# Patient Record
Sex: FEMALE | Race: WHITE | NOT HISPANIC OR LATINO | Employment: UNEMPLOYED | URBAN - METROPOLITAN AREA
[De-identification: names, ages, dates, MRNs, and addresses within clinical notes are randomized per-mention and may not be internally consistent; named-entity substitution may affect disease eponyms.]

---

## 2017-09-29 ENCOUNTER — OFFICE VISIT (OUTPATIENT)
Dept: INTERNAL MEDICINE | Facility: CLINIC | Age: 32
End: 2017-09-29

## 2017-09-29 ENCOUNTER — TELEPHONE (OUTPATIENT)
Dept: INTERNAL MEDICINE | Facility: CLINIC | Age: 32
End: 2017-09-29

## 2017-09-29 VITALS
HEIGHT: 61 IN | HEART RATE: 85 BPM | WEIGHT: 109.4 LBS | BODY MASS INDEX: 20.65 KG/M2 | TEMPERATURE: 97.5 F | DIASTOLIC BLOOD PRESSURE: 76 MMHG | SYSTOLIC BLOOD PRESSURE: 118 MMHG | OXYGEN SATURATION: 97 %

## 2017-09-29 DIAGNOSIS — G47.00 INSOMNIA, UNSPECIFIED TYPE: ICD-10-CM

## 2017-09-29 DIAGNOSIS — S00.411A: ICD-10-CM

## 2017-09-29 DIAGNOSIS — G43.109 MIGRAINE WITH AURA AND WITHOUT STATUS MIGRAINOSUS, NOT INTRACTABLE: Primary | ICD-10-CM

## 2017-09-29 DIAGNOSIS — M53.3 CHRONIC SI JOINT PAIN: ICD-10-CM

## 2017-09-29 DIAGNOSIS — R39.198 DIFFICULTY URINATING: ICD-10-CM

## 2017-09-29 DIAGNOSIS — G89.29 CHRONIC SI JOINT PAIN: ICD-10-CM

## 2017-09-29 DIAGNOSIS — L08.9 SKIN INFECTION: ICD-10-CM

## 2017-09-29 DIAGNOSIS — Z79.899 LONG-TERM USE OF HIGH-RISK MEDICATION: ICD-10-CM

## 2017-09-29 LAB
AMPHET+METHAMPHET UR QL: NEGATIVE
AMPHETAMINES UR QL: NEGATIVE
B-HCG UR QL: NEGATIVE
BARBITURATES UR QL SCN: NEGATIVE
BENZODIAZ UR QL SCN: NEGATIVE
BILIRUB BLD-MCNC: NEGATIVE MG/DL
BUPRENORPHINE SERPL-MCNC: NEGATIVE NG/ML
CANNABINOIDS SERPL QL: NEGATIVE
CLARITY, POC: CLEAR
COCAINE UR QL: NEGATIVE
COLOR UR: NORMAL
GLUCOSE UR STRIP-MCNC: NEGATIVE MG/DL
INTERNAL NEGATIVE CONTROL: NEGATIVE
INTERNAL POSITIVE CONTROL: POSITIVE
KETONES UR QL: NEGATIVE
LEUKOCYTE EST, POC: NEGATIVE
Lab: NORMAL
METHADONE UR QL SCN: NEGATIVE
MRSA DNA SPEC QL NAA+PROBE: NEGATIVE
NITRITE UR-MCNC: NEGATIVE MG/ML
OPIATES UR QL: NEGATIVE
OXYCODONE UR QL SCN: NEGATIVE
PCP UR QL SCN: NEGATIVE
PH UR: 6 [PH] (ref 5–8)
PROPOXYPH UR QL: NEGATIVE
PROT UR STRIP-MCNC: NEGATIVE MG/DL
RBC # UR STRIP: NEGATIVE /UL
SP GR UR: 1.01 (ref 1–1.03)
TRICYCLICS UR QL SCN: NEGATIVE
UROBILINOGEN UR QL: NORMAL

## 2017-09-29 PROCEDURE — 99203 OFFICE O/P NEW LOW 30 MIN: CPT | Performed by: FAMILY MEDICINE

## 2017-09-29 PROCEDURE — 81025 URINE PREGNANCY TEST: CPT | Performed by: FAMILY MEDICINE

## 2017-09-29 PROCEDURE — 80306 DRUG TEST PRSMV INSTRMNT: CPT | Performed by: FAMILY MEDICINE

## 2017-09-29 PROCEDURE — 87641 MR-STAPH DNA AMP PROBE: CPT | Performed by: FAMILY MEDICINE

## 2017-09-29 RX ORDER — BUTALBITAL, ACETAMINOPHEN AND CAFFEINE 300; 40; 50 MG/1; MG/1; MG/1
1 CAPSULE ORAL EVERY 4 HOURS PRN
Qty: 84 CAPSULE | Refills: 1 | Status: SHIPPED | OUTPATIENT
Start: 2017-09-29 | End: 2017-09-29 | Stop reason: SDUPTHER

## 2017-09-29 RX ORDER — LANOLIN ALCOHOL/MO/W.PET/CERES
1 CREAM (GRAM) TOPICAL DAILY
Qty: 30 TABLET | Refills: 3 | OUTPATIENT
Start: 2017-09-29 | End: 2020-01-19

## 2017-09-29 RX ORDER — BUTALBITAL, ACETAMINOPHEN AND CAFFEINE 300; 40; 50 MG/1; MG/1; MG/1
1 CAPSULE ORAL EVERY 4 HOURS PRN
Qty: 30 CAPSULE | Refills: 1 | Status: SHIPPED | OUTPATIENT
Start: 2017-09-29 | End: 2017-11-24

## 2017-09-29 RX ORDER — ZOLPIDEM TARTRATE 10 MG/1
10 TABLET ORAL NIGHTLY PRN
Qty: 30 TABLET | Refills: 1 | Status: SHIPPED | OUTPATIENT
Start: 2017-09-29 | End: 2017-11-28 | Stop reason: SDUPTHER

## 2017-09-29 NOTE — TELEPHONE ENCOUNTER
----- Message from Anai PURDY MD sent at 9/29/2017 12:22 PM EDT -----  Regarding: colonoscopy report  Check for colonoscopy report [facility] about 2005

## 2017-09-29 NOTE — TELEPHONE ENCOUNTER
Spoke to Katie at  medical records.  Their records only go back to 2010.  They do not have a colonoscopy
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
## 2017-11-22 ENCOUNTER — TELEPHONE (OUTPATIENT)
Dept: INTERNAL MEDICINE | Facility: CLINIC | Age: 32
End: 2017-11-22

## 2017-11-24 ENCOUNTER — APPOINTMENT (OUTPATIENT)
Dept: CT IMAGING | Facility: HOSPITAL | Age: 32
End: 2017-11-24

## 2017-11-24 ENCOUNTER — HOSPITAL ENCOUNTER (EMERGENCY)
Facility: HOSPITAL | Age: 32
Discharge: HOME | End: 2017-11-24
Attending: EMERGENCY MEDICINE | Admitting: EMERGENCY MEDICINE

## 2017-11-24 VITALS
DIASTOLIC BLOOD PRESSURE: 80 MMHG | HEART RATE: 94 BPM | WEIGHT: 110 LBS | SYSTOLIC BLOOD PRESSURE: 121 MMHG | BODY MASS INDEX: 21.6 KG/M2 | TEMPERATURE: 98.3 F | RESPIRATION RATE: 16 BRPM | HEIGHT: 60 IN | OXYGEN SATURATION: 100 %

## 2017-11-24 DIAGNOSIS — G51.0 BELL'S PALSY: Primary | ICD-10-CM

## 2017-11-24 DIAGNOSIS — Z86.69 HISTORY OF MIGRAINE: ICD-10-CM

## 2017-11-24 DIAGNOSIS — G43.109 MIGRAINE WITH AURA AND WITHOUT STATUS MIGRAINOSUS, NOT INTRACTABLE: ICD-10-CM

## 2017-11-24 PROCEDURE — 99283 EMERGENCY DEPT VISIT LOW MDM: CPT

## 2017-11-24 PROCEDURE — 70450 CT HEAD/BRAIN W/O DYE: CPT

## 2017-11-24 RX ORDER — TRAMADOL HYDROCHLORIDE 50 MG/1
50 TABLET ORAL ONCE
Status: COMPLETED | OUTPATIENT
Start: 2017-11-24 | End: 2017-11-24

## 2017-11-24 RX ORDER — BUTALBITAL, ACETAMINOPHEN AND CAFFEINE 300; 40; 50 MG/1; MG/1; MG/1
1 CAPSULE ORAL EVERY 4 HOURS PRN
Qty: 15 CAPSULE | Refills: 0 | Status: SHIPPED | OUTPATIENT
Start: 2017-11-24 | End: 2017-11-27

## 2017-11-24 RX ORDER — METHYLPREDNISOLONE 4 MG/1
TABLET ORAL
Qty: 21 TABLET | Refills: 0 | Status: SHIPPED | OUTPATIENT
Start: 2017-11-24 | End: 2017-12-22

## 2017-11-24 RX ORDER — MINERAL OIL, PETROLATUM 425; 568 MG/G; MG/G
OINTMENT OPHTHALMIC
Qty: 7 G | Refills: 0 | OUTPATIENT
Start: 2017-11-24 | End: 2020-01-19

## 2017-11-24 RX ORDER — KETOROLAC TROMETHAMINE 15 MG/ML
15 INJECTION, SOLUTION INTRAMUSCULAR; INTRAVENOUS ONCE
Status: DISCONTINUED | OUTPATIENT
Start: 2017-11-24 | End: 2017-11-24

## 2017-11-24 RX ORDER — ACYCLOVIR 400 MG/1
400 TABLET ORAL
Qty: 35 TABLET | Refills: 0 | Status: SHIPPED | OUTPATIENT
Start: 2017-11-24 | End: 2017-12-22

## 2017-11-24 RX ORDER — TRAMADOL HYDROCHLORIDE 50 MG/1
50 TABLET ORAL EVERY 6 HOURS PRN
Qty: 15 TABLET | Refills: 0 | Status: SHIPPED | OUTPATIENT
Start: 2017-11-24 | End: 2017-12-22

## 2017-11-24 RX ADMIN — TRAMADOL HYDROCHLORIDE 50 MG: 50 TABLET, COATED ORAL at 18:40

## 2017-11-24 NOTE — DISCHARGE INSTRUCTIONS
Use lubricating drops/ointment every hour as needed to prevent dryness of the eye.  Follow-up with Dr. Green Monday morning for recheck.  Return to the emergency department immediately if any change or worsening of your symptoms.

## 2017-11-24 NOTE — ED PROVIDER NOTES
Subjective   Patient is a 32 y.o. female presenting with neurologic complaint.   History provided by:  Patient  Neurologic Problem   The patient's primary symptoms include focal weakness. This is a new problem. The current episode started today. The neurological problem developed suddenly. Last Known Well [facility]: Yesterday evening.  The problem is unchanged. There was left-sided and facial focality noted. Associated symptoms include headaches. Pertinent negatives include no abdominal pain, auditory change, aura, back pain, confusion, dizziness, fatigue, fever, nausea, neck pain, vertigo or vomiting. Past treatments include nothing. There is no history of a bleeding disorder, a CVA, dementia, head trauma, liver disease, mood changes or seizures.     32-year-old female presents to emergency department with right sided facial droop noticed this morning.  Patient has had a headache for the past 2 days, predominantly left-sided, associated with some nausea but no vomiting.  She denies upper extremity or lower show any weakness paresis paresthesias or radicular symptoms.  No photophobia.  She denies recent infection trauma.  No recent changes to her medication.  She states the facial droop is associated with inability to close her left eye, and she is having some slight drooling from the left side of her mouth.  She says the left side of her tongue is numb.  She does have some left-sided facial paresthesias as well.    Review of Systems   Constitutional: Negative for fatigue and fever.   Gastrointestinal: Negative for abdominal pain, nausea and vomiting.   Musculoskeletal: Negative for back pain and neck pain.   Neurological: Positive for focal weakness and headaches. Negative for dizziness and vertigo.        Per history of present illness   Psychiatric/Behavioral: Negative for confusion.   All other systems reviewed and are negative.      Past Medical History:   Diagnosis Date   • Fracture of lumbar vertebra     age 14    • Fractures    • History of severe pre-eclampsia    • Hypertension    • Kidney infection    • Kidney stone    • Migraine    • Preeclampsia, severe    • Seizures 2007    one seizure post partum period preeclampsia   • Urinary tract infection        Allergies   Allergen Reactions   • Benadryl [Diphenhydramine] Shortness Of Breath     Heart racing, muscle cramping   • Vancomycin Shortness Of Breath     Red man syndrome   • Compazine [Prochlorperazine] Other (See Comments)     Restless and agitation   • Reglan [Metoclopramide] Other (See Comments)     agitation   • Toradol [Ketorolac Tromethamine] Other (See Comments)     Heart racing and increased BP   • Triptans Other (See Comments)     restless       Past Surgical History:   Procedure Laterality Date   • BREAST AUGMENTATION Bilateral 2009   •  SECTION      x 2-   ,    • COLONOSCOPY  2005    bleeding, attempt at EGD unsuccessful-polyps found [facility]   • KIDNEY STONE SURGERY     • WISDOM TOOTH EXTRACTION         Family History   Problem Relation Age of Onset   • Arthritis Mother    • Hypertension Mother    • Depression Mother    • Cancer Father      throat   • Diabetes Father    • Hypertension Father    • Heart disease Maternal Grandmother    • Hypertension Maternal Grandfather    • No Known Problems Brother    • Mitochondrial disorder Son    • Neutropenia Son    • Anemia Son      sideroblastic   • Hypertension Maternal Aunt    • Thyroid disease Maternal Aunt    • Migraines Maternal Aunt    • Depression Maternal Aunt    • Arthritis Paternal Grandmother    • Cancer Paternal Grandmother      skin   • Heart disease Paternal Grandmother      stents   • Hyperlipidemia Paternal Grandfather    • Hypertension Paternal Grandfather    • Diabetes Paternal Grandfather    • Neutropenia Daughter    • Anemia Daughter      sideroblastic   • No Known Problems Brother    • No Known Problems Brother        Social History     Social History   • Marital status: Single      Spouse name: N/A   • Number of children: N/A   • Years of education: N/A     Social History Main Topics   • Smoking status: Never Smoker   • Smokeless tobacco: Never Used   • Alcohol use No   • Drug use: No   • Sexual activity: Yes     Partners: Male     Birth control/ protection: Condom      Comment:      Other Topics Concern   • None     Social History Narrative    Working part time           Objective   Physical Exam   Constitutional: She is oriented to person, place, and time. She appears well-developed and well-nourished. No distress.   HENT:   Head: Normocephalic and atraumatic.   Right Ear: External ear normal.   Left Ear: External ear normal.   Nose: Nose normal.   Mouth/Throat: Oropharynx is clear and moist. No oropharyngeal exudate.   Left-sided facial droop including the forehead.  PERRLA EOMI visual acuity grossly intact visual fields are grossly full.  There is no pre-or postauricular tenderness, no mastoid tenderness.  No scalp or facial lesions noted.   Eyes: Conjunctivae and EOM are normal. Pupils are equal, round, and reactive to light. Right eye exhibits no discharge. Left eye exhibits no discharge. No scleral icterus.   Neck: Normal range of motion. Neck supple. No JVD present. No tracheal deviation present. No thyromegaly present.   Cardiovascular: Normal rate, regular rhythm, normal heart sounds and intact distal pulses.  Exam reveals no gallop and no friction rub.    No murmur heard.  Pulmonary/Chest: Effort normal. No stridor. No respiratory distress. She has no wheezes. She has no rales. She exhibits no tenderness.   Abdominal: Soft. She exhibits no distension and no mass. There is no tenderness. There is no guarding.   Musculoskeletal: Normal range of motion. She exhibits no edema, tenderness or deformity.   Neurological: She is alert and oriented to person, place, and time. She displays normal reflexes. A cranial nerve deficit (Left-sided facial nerve palsy.) is present. She  exhibits normal muscle tone (Left facial weakness per history of present illness.  No upper or lower extremity weakness paresis or paresthesias noted.). Coordination normal.   Cerebellar functions are intact, no pronator drift.  DTRs are 2+ over 4+ bilaterally in the upper and lower extremities.   Skin: Skin is warm and dry. No rash noted. She is not diaphoretic. No erythema. No pallor.   Psychiatric: She has a normal mood and affect. Her behavior is normal. Judgment and thought content normal.   Nursing note and vitals reviewed.      Procedures         ED Course  ED Course   Comment By Time   Neurologic findings are limited to the left facial area, consistent with Bell's palsy.  CT of the head without contrast is negative for acute findings.  Reviewed with Dr. Sifuentes, will treat with prednisone and Zovirax, and tramadol for pain.  She will follow-up with her primary care provider on Monday, and return if any change or worsening in the interim.  She voices understanding and is agreeable to plan. Rusty Currie PA-C 11/24 2212                  [facility]    Final diagnoses:   Bell's palsy   History of migraine            Rusty Currie PA-C  11/24/17 2212

## 2017-11-27 RX ORDER — BUTALBITAL, ACETAMINOPHEN AND CAFFEINE 50; 325; 40 MG/1; MG/1; MG/1
1 TABLET ORAL EVERY 4 HOURS PRN
Qty: 15 TABLET | Refills: 0 | Status: SHIPPED | OUTPATIENT
Start: 2017-11-27 | End: 2017-12-22 | Stop reason: SDUPTHER

## 2017-11-27 NOTE — TELEPHONE ENCOUNTER
[facility] called back today. The formulary accepts 50/325/40 instead of 50/300/40. They initially had approved the written prescription, but called back immediately and retracted, asking that we write for the formulary please.

## 2017-11-28 ENCOUNTER — TELEPHONE (OUTPATIENT)
Dept: INTERNAL MEDICINE | Facility: CLINIC | Age: 32
End: 2017-11-28

## 2017-11-28 DIAGNOSIS — G47.00 INSOMNIA, UNSPECIFIED TYPE: ICD-10-CM

## 2017-11-28 RX ORDER — ZOLPIDEM TARTRATE 10 MG/1
10 TABLET ORAL NIGHTLY PRN
Qty: 30 TABLET | Refills: 0 | Status: SHIPPED | OUTPATIENT
Start: 2017-11-28 | End: 2018-02-07 | Stop reason: SDUPTHER

## 2017-11-28 NOTE — TELEPHONE ENCOUNTER
Refill on printer,  If [place] palsy getting worse since ER visit, should be seen today and not wait until Friday

## 2017-11-28 NOTE — TELEPHONE ENCOUNTER
Called patient because pharmacy on file has no record of her prescriptions. She is coming Friday for a flare up of bells palsy. She would like a refill of her ambien called in to the [facility] on [address].

## 2017-12-11 ENCOUNTER — TELEPHONE (OUTPATIENT)
Dept: INTERNAL MEDICINE | Facility: CLINIC | Age: 32
End: 2017-12-11

## 2017-12-11 NOTE — TELEPHONE ENCOUNTER
PLEASE CALL PT SHE NEEDS A MED CALLED IN BUT UNCLEAR STATES SHE CALLED A WEEK AGO NO MESSAGE WAS SEEN

## 2017-12-22 ENCOUNTER — OFFICE VISIT (OUTPATIENT)
Dept: INTERNAL MEDICINE | Facility: CLINIC | Age: 32
End: 2017-12-22

## 2017-12-22 VITALS
DIASTOLIC BLOOD PRESSURE: 82 MMHG | TEMPERATURE: 97.9 F | SYSTOLIC BLOOD PRESSURE: 128 MMHG | BODY MASS INDEX: 19.61 KG/M2 | OXYGEN SATURATION: 98 % | WEIGHT: 100.4 LBS | HEART RATE: 101 BPM

## 2017-12-22 DIAGNOSIS — G43.109 MIGRAINE WITH AURA AND WITHOUT STATUS MIGRAINOSUS, NOT INTRACTABLE: Primary | ICD-10-CM

## 2017-12-22 DIAGNOSIS — F43.29 STRESS AND ADJUSTMENT REACTION: ICD-10-CM

## 2017-12-22 DIAGNOSIS — R63.4 WEIGHT LOSS: ICD-10-CM

## 2017-12-22 DIAGNOSIS — Z23 NEED FOR INFLUENZA VACCINATION: ICD-10-CM

## 2017-12-22 DIAGNOSIS — Z20.5 EXPOSURE TO HEPATITIS: ICD-10-CM

## 2017-12-22 DIAGNOSIS — M79.89 SWELLING OF BOTH HANDS: ICD-10-CM

## 2017-12-22 DIAGNOSIS — R68.81 EARLY SATIETY: ICD-10-CM

## 2017-12-22 PROCEDURE — 99214 OFFICE O/P EST MOD 30 MIN: CPT | Performed by: FAMILY MEDICINE

## 2017-12-22 RX ORDER — BUTALBITAL, ACETAMINOPHEN AND CAFFEINE 50; 325; 40 MG/1; MG/1; MG/1
1 TABLET ORAL EVERY 4 HOURS PRN
Qty: 60 TABLET | Refills: 0 | Status: SHIPPED | OUTPATIENT
Start: 2017-12-22 | End: 2018-02-07 | Stop reason: SDUPTHER

## 2017-12-22 NOTE — PROGRESS NOTES
Subjective   Marcia Goel is a 32 y.o. female.     Chief Complaint   Patient presents with   • Weight Loss   • bell's Palsy   • migraine medication     insurance requested quantity of 60 or 90       Visit Vitals   • /82   • Pulse 101   • Temp 97.9 °F (36.6 °C)   • Wt 45.5 kg (100 lb 6.4 oz)   • SpO2 98%   • BMI 19.61 kg/m2       Weight Loss   This is a new problem. The current episode started more than 1 month ago. The problem occurs constantly. The problem has been unchanged. Associated symptoms include anorexia, arthralgias, fatigue, headaches, joint swelling, nausea and neck pain. Pertinent negatives include no abdominal pain, change in bowel habit, chest pain, chills, congestion, coughing, diaphoresis, fever, myalgias, numbness, rash, sore throat, swollen glands, urinary symptoms, vertigo, visual change, vomiting or weakness. Exacerbated by: decreased appetite. She has tried nothing for the symptoms. The treatment provided no relief.   Headache    This is a recurrent problem. The current episode started in the past 7 days. Episode frequency: 2 x per week and lasts for a few days. The problem has been unchanged. The pain is located in the bilateral and occipital region. The pain does not radiate. The pain quality is similar to prior headaches. The quality of the pain is described as throbbing. The pain is at a severity of 8/10. The pain is moderate. Associated symptoms include anorexia, insomnia, nausea, neck pain, tingling and weight loss. Pertinent negatives include no abdominal pain, abnormal behavior, back pain, blurred vision, coughing, dizziness, drainage, ear pain, eye pain, eye redness, eye watering, facial sweating, fever, hearing loss, loss of balance, muscle aches, numbness, phonophobia, photophobia, rhinorrhea, scalp tenderness, seizures, sinus pressure, sore throat, swollen glands, tinnitus, visual change, vomiting or weakness. Treatments tried: fioricet. The treatment provided moderate  relief.        Pt has been getting migraines 1-2 x per week.   Pt has a lot of stress and the tension headaches will turn into migraine.  Pt is taking magnesium.  Pt's roommate had hepatitis B. They did share razors.     Pt had left Bell's palsy this was the 2nd time she had Libby.    Pt had 2 disabled children that she cannot see and is in court for visitation.   Pt is moving out of hotel this weekend.  Parents and step parent  in the past 4 years.    Pt has decreased appetite and is not eating as much. Pt has lost 10# in the past month.  Hands and feet are swollen every day when she gets up.   Pt having vaginal discharge.   The following portions of the patient's history were reviewed and updated as appropriate: allergies, current medications, past family history, past medical history, past social history, past surgical history and problem list.    Past Medical History:   Diagnosis Date   • Fracture of lumbar vertebra     age 14   • Fractures    • History of severe pre-eclampsia    • Hypertension    • Kidney infection    • Kidney stone    • Migraine    • Preeclampsia, severe    • Seizures     one seizure post partum period preeclampsia   • Urinary tract infection      Past Surgical History:   Procedure Laterality Date   • BREAST AUGMENTATION Bilateral    •  SECTION      x 2-   ,    • COLONOSCOPY  2005    bleeding, attempt at EGD unsuccessful-polyps found [facility]   • KIDNEY STONE SURGERY     • WISDOM TOOTH EXTRACTION       Allergies   Allergen Reactions   • Benadryl [Diphenhydramine] Shortness Of Breath     Heart racing, muscle cramping   • Vancomycin Shortness Of Breath     Red man syndrome   • Compazine [Prochlorperazine] Other (See Comments)     Restless and agitation   • Reglan [Metoclopramide] Other (See Comments)     agitation   • Toradol [Ketorolac Tromethamine] Other (See Comments)     Heart racing and increased BP   • Triptans Other (See Comments)     restless       Family  History   Problem Relation Age of Onset   • Arthritis Mother    • Hypertension Mother    • Depression Mother    • Cancer Father      throat   • Diabetes Father    • Hypertension Father    • Heart disease Maternal Grandmother    • Hypertension Maternal Grandfather    • No Known Problems Brother    • Mitochondrial disorder Son    • Neutropenia Son    • Anemia Son      sideroblastic   • Hypertension Maternal Aunt    • Thyroid disease Maternal Aunt    • Migraines Maternal Aunt    • Depression Maternal Aunt    • Arthritis Paternal Grandmother    • Cancer Paternal Grandmother      skin   • Heart disease Paternal Grandmother      stents   • Hyperlipidemia Paternal Grandfather    • Hypertension Paternal Grandfather    • Diabetes Paternal Grandfather    • Neutropenia Daughter    • Anemia Daughter      sideroblastic   • No Known Problems Brother    • No Known Problems Brother        Social History     Social History   • Marital status: Single     Spouse name: N/A   • Number of children: N/A   • Years of education: N/A     Occupational History   • Not on file.     Social History Main Topics   • Smoking status: Never Smoker   • Smokeless tobacco: Never Used   • Alcohol use No   • Drug use: No   • Sexual activity: Yes     Partners: Male     Birth control/ protection: Condom      Comment:      Other Topics Concern   • Not on file     Social History Narrative    Working part time       Review of Systems   Constitutional: Positive for fatigue and weight loss. Negative for chills, diaphoresis and fever.   HENT: Negative for congestion, ear pain, hearing loss, nosebleeds, postnasal drip, rhinorrhea, sinus pressure, sneezing, sore throat and tinnitus.    Eyes: Negative.  Negative for blurred vision, photophobia, pain, redness and itching.   Respiratory: Negative.  Negative for cough, shortness of breath and wheezing.    Cardiovascular: Negative.  Negative for chest pain and palpitations.   Gastrointestinal: Positive for  anorexia and nausea. Negative for abdominal pain, change in bowel habit, constipation, diarrhea and vomiting.   Endocrine: Negative.  Negative for cold intolerance and heat intolerance.   Genitourinary: Negative.  Negative for dysuria, frequency, hematuria and urgency.   Musculoskeletal: Positive for arthralgias, joint swelling and neck pain. Negative for back pain and myalgias.   Skin: Negative.  Negative for color change and rash.   Allergic/Immunologic: Negative.  Negative for environmental allergies.   Neurological: Positive for tingling and headaches. Negative for dizziness, vertigo, seizures, syncope, weakness, light-headedness, numbness and loss of balance.   Hematological: Negative.  Negative for adenopathy. Does not bruise/bleed easily.   Psychiatric/Behavioral: Positive for dysphoric mood. The patient has insomnia. The patient is not nervous/anxious.         Situational depression       Objective   Physical Exam   Constitutional: She is oriented to person, place, and time. She appears well-developed.   HENT:   Head: Normocephalic.   Right Ear: External ear normal.   Left Ear: External ear normal.   Nose: Nose normal.   Eyes: Conjunctivae and EOM are normal. Pupils are equal, round, and reactive to light.   Neck: Normal range of motion. Neck supple. Muscular tenderness present. No spinous process tenderness present. No thyroid mass and no thyromegaly present.   Cardiovascular: Normal rate and regular rhythm.    No murmur heard.  Pulmonary/Chest: Effort normal and breath sounds normal.   Abdominal: Soft. Bowel sounds are normal.   Musculoskeletal: Normal range of motion.   Neurological: She is alert and oriented to person, place, and time.   Skin: Skin is warm and dry.   Psychiatric: She has a normal mood and affect. Her behavior is normal. Judgment and thought content normal. Her speech is rapid and/or pressured. Cognition and memory are normal.   Nursing note and vitals reviewed.      Assessment/Plan    Marcia was seen today for weight loss, bell's palsy and migraine medication.    Diagnoses and all orders for this visit:    Migraine with aura and without status migrainosus, not intractable  -     butalbital-acetaminophen-caffeine (ESGIC) -40 MG per tablet; Take 1 tablet by mouth Every 4 (Four) Hours As Needed for Headache.    Stress and adjustment reaction  -     Ambulatory Referral to Behavioral Health    Weight loss  -     Comprehensive Metabolic Panel  -     CBC & Differential  -     TSH  -     C-reactive Protein  -     Sedimentation Rate  -     Nutritional Supplements (ENSURE COMPLETE) liquid; Take 1 bottle by mouth Daily.    Swelling of both hands  -     C-reactive Protein  -     Sedimentation Rate  -     Nuclear Antigen Antibody, IFA  -     Rheumatoid Factor    Early satiety  -     Lipase  -     Amylase  -     H. Pylori Breath Test - Breath, Lung    Exposure to hepatitis  -     Comprehensive Metabolic Panel  -     Hepatitis Panel, Acute    Need for influenza vaccination  -     Cancel: Flu Vaccine Quad PF 3YR+ (FLUARIX [id number])                   Current Outpatient Prescriptions:   •  artificial tears (REFRESH LACRI-LUBE) ointment ophthalmic ointment, Administer  to both eyes Every 1 (One) Hour As Needed (7)., Disp: 7 g, Rfl: 0  •  butalbital-acetaminophen-caffeine (ESGIC) -40 MG per tablet, Take 1 tablet by mouth Every 4 (Four) Hours As Needed for Headache., Disp: 60 tablet, Rfl: 0  •  Magnesium Oxide 400 (240 Mg) MG tablet, Take 1 tablet by mouth Daily., Disp: 30 tablet, Rfl: 3  •  zolpidem (AMBIEN) 10 MG tablet, Take 1 tablet by mouth At Night As Needed for Sleep., Disp: 30 tablet, Rfl: 0  •  Nutritional Supplements (ENSURE COMPLETE) liquid, Take 1 bottle by mouth Daily., Disp: 30 bottle, Rfl: 1    Return in about 4 weeks (around 1/19/2018), or if symptoms worsen or fail to improve, for Recheck.

## 2018-02-05 ENCOUNTER — TELEPHONE (OUTPATIENT)
Dept: INTERNAL MEDICINE | Facility: CLINIC | Age: 33
End: 2018-02-05

## 2018-02-05 DIAGNOSIS — G47.00 INSOMNIA, UNSPECIFIED TYPE: ICD-10-CM

## 2018-02-05 DIAGNOSIS — G43.109 MIGRAINE WITH AURA AND WITHOUT STATUS MIGRAINOSUS, NOT INTRACTABLE: ICD-10-CM

## 2018-02-07 RX ORDER — BUTALBITAL, ACETAMINOPHEN AND CAFFEINE 50; 325; 40 MG/1; MG/1; MG/1
1 TABLET ORAL EVERY 4 HOURS PRN
Qty: 30 TABLET | Refills: 0 | Status: SHIPPED | OUTPATIENT
Start: 2018-02-07 | End: 2018-03-13 | Stop reason: SDUPTHER

## 2018-02-07 RX ORDER — ZOLPIDEM TARTRATE 10 MG/1
10 TABLET ORAL NIGHTLY PRN
Qty: 30 TABLET | Refills: 0 | Status: SHIPPED | OUTPATIENT
Start: 2018-02-07 | End: 2018-03-13 | Stop reason: SDUPTHER

## 2018-02-07 NOTE — TELEPHONE ENCOUNTER
Notified patient that ambien was written. She stated that it was also a request for her fiorcet as her migraines get very bad. Informed patient that it may be a little longer as I have to get that refill approved as well. She asked that the medications be called in to [address]

## 2018-02-07 NOTE — TELEPHONE ENCOUNTER
Called Mrs Goel to inquire about her repeated no shows. She denies knowing about the visits at first, but then recalled rescheduling them? Mrs Goel believed that she had left messages on our answering machine. I double checked up front as I do not believe we have an answering machine at this office. She also has had a hard time getting rides, but stated that she does have an [employer] gift card now.

## 2018-03-07 ENCOUNTER — TELEPHONE (OUTPATIENT)
Dept: INTERNAL MEDICINE | Facility: CLINIC | Age: 33
End: 2018-03-07

## 2018-03-12 ENCOUNTER — TELEPHONE (OUTPATIENT)
Dept: INTERNAL MEDICINE | Facility: CLINIC | Age: 33
End: 2018-03-12

## 2018-03-12 NOTE — TELEPHONE ENCOUNTER
Spoke with Mrs Goel. She has cracked/chapped fingers. She does not recall any trauma or chemical injury. She is using neosporin and vasoline-type ointment. Advised her to keep appointment tomorrow and not use lotions which may irritate the open skin where she has some split skin.

## 2018-03-13 ENCOUNTER — TELEPHONE (OUTPATIENT)
Dept: INTERNAL MEDICINE | Facility: CLINIC | Age: 33
End: 2018-03-13

## 2018-03-13 ENCOUNTER — OFFICE VISIT (OUTPATIENT)
Dept: INTERNAL MEDICINE | Facility: CLINIC | Age: 33
End: 2018-03-13

## 2018-03-13 VITALS
OXYGEN SATURATION: 99 % | HEART RATE: 108 BPM | TEMPERATURE: 98.7 F | WEIGHT: 109 LBS | SYSTOLIC BLOOD PRESSURE: 132 MMHG | BODY MASS INDEX: 21.29 KG/M2 | DIASTOLIC BLOOD PRESSURE: 92 MMHG

## 2018-03-13 DIAGNOSIS — Z79.899 ENCOUNTER FOR LONG-TERM CURRENT USE OF MEDICATION: ICD-10-CM

## 2018-03-13 DIAGNOSIS — R21 RASH OF HANDS: Primary | ICD-10-CM

## 2018-03-13 DIAGNOSIS — G47.00 INSOMNIA, UNSPECIFIED TYPE: ICD-10-CM

## 2018-03-13 DIAGNOSIS — G43.109 MIGRAINE WITH AURA AND WITHOUT STATUS MIGRAINOSUS, NOT INTRACTABLE: ICD-10-CM

## 2018-03-13 DIAGNOSIS — R82.90 ABNORMAL URINE ODOR: ICD-10-CM

## 2018-03-13 DIAGNOSIS — F43.9 STRESS: ICD-10-CM

## 2018-03-13 LAB
AMPHET+METHAMPHET UR QL: POSITIVE
AMPHETAMINES UR QL: NEGATIVE
BARBITURATES UR QL SCN: NEGATIVE
BENZODIAZ UR QL SCN: NEGATIVE
BILIRUB BLD-MCNC: NEGATIVE MG/DL
BUPRENORPHINE SERPL-MCNC: NEGATIVE NG/ML
CANNABINOIDS SERPL QL: NEGATIVE
CLARITY, POC: CLEAR
COCAINE UR QL: NEGATIVE
COLOR UR: YELLOW
GLUCOSE UR STRIP-MCNC: NEGATIVE MG/DL
KETONES UR QL: NEGATIVE
LEUKOCYTE EST, POC: NEGATIVE
METHADONE UR QL SCN: NEGATIVE
NITRITE UR-MCNC: NEGATIVE MG/ML
OPIATES UR QL: NEGATIVE
OXYCODONE UR QL SCN: NEGATIVE
PCP UR QL SCN: NEGATIVE
PH UR: 6 [PH] (ref 5–8)
PROPOXYPH UR QL: NEGATIVE
PROT UR STRIP-MCNC: NEGATIVE MG/DL
RBC # UR STRIP: ABNORMAL /UL
SP GR UR: 1.03 (ref 1–1.03)
TRICYCLICS UR QL SCN: NEGATIVE
UROBILINOGEN UR QL: NORMAL

## 2018-03-13 PROCEDURE — 80306 DRUG TEST PRSMV INSTRMNT: CPT | Performed by: FAMILY MEDICINE

## 2018-03-13 PROCEDURE — 87186 SC STD MICRODIL/AGAR DIL: CPT | Performed by: FAMILY MEDICINE

## 2018-03-13 PROCEDURE — 99214 OFFICE O/P EST MOD 30 MIN: CPT | Performed by: FAMILY MEDICINE

## 2018-03-13 PROCEDURE — 87077 CULTURE AEROBIC IDENTIFY: CPT | Performed by: FAMILY MEDICINE

## 2018-03-13 PROCEDURE — 87086 URINE CULTURE/COLONY COUNT: CPT | Performed by: FAMILY MEDICINE

## 2018-03-13 RX ORDER — BUTALBITAL, ACETAMINOPHEN AND CAFFEINE 50; 325; 40 MG/1; MG/1; MG/1
1 TABLET ORAL EVERY 4 HOURS PRN
Qty: 30 TABLET | Refills: 0 | Status: SHIPPED | OUTPATIENT
Start: 2018-03-13 | End: 2018-03-13 | Stop reason: SDUPTHER

## 2018-03-13 RX ORDER — ZOLPIDEM TARTRATE 10 MG/1
10 TABLET ORAL NIGHTLY PRN
Qty: 30 TABLET | Refills: 2 | OUTPATIENT
Start: 2018-03-13 | End: 2020-01-19

## 2018-03-13 RX ORDER — ZOLPIDEM TARTRATE 10 MG/1
10 TABLET ORAL NIGHTLY PRN
Qty: 30 TABLET | Refills: 0 | Status: SHIPPED | OUTPATIENT
Start: 2018-03-13 | End: 2018-03-13 | Stop reason: SDUPTHER

## 2018-03-13 RX ORDER — BUTALBITAL, ACETAMINOPHEN AND CAFFEINE 50; 325; 40 MG/1; MG/1; MG/1
1 TABLET ORAL EVERY 4 HOURS PRN
Qty: 30 TABLET | Refills: 2 | OUTPATIENT
Start: 2018-03-13 | End: 2020-01-19

## 2018-03-13 NOTE — PROGRESS NOTES
Subjective   Marcia Goel is a 32 y.o. female.     Chief Complaint   Patient presents with   • Med Management     follow up appt   • cracking hands   • urine odor       Visit Vitals  /92   Pulse 108   Temp 98.7 °F (37.1 °C)   Wt 49.4 kg (109 lb)   SpO2 99%   BMI 21.29 kg/m²       Headache    This is a recurrent problem. The problem occurs monthly (weekly). The pain is located in the bilateral and retro-orbital (right neck pain) region. The pain quality is similar to prior headaches. The quality of the pain is described as aching and throbbing. The pain is at a severity of 10/10. Associated symptoms include nausea, neck pain, rhinorrhea and sinus pressure. Pertinent negatives include no abdominal pain, abnormal behavior, anorexia, back pain, blurred vision, coughing, dizziness, drainage, ear pain, eye pain, eye redness, eye watering, facial sweating, fever, hearing loss, insomnia, loss of balance, muscle aches, numbness, phonophobia, photophobia, scalp tenderness, seizures, sore throat, swollen glands, tingling, tinnitus, visual change, vomiting, weakness or weight loss. The symptoms are aggravated by emotional stress and menstrual cycle. She has tried acetaminophen (esgic) for the symptoms. The treatment provided moderate relief. Her past medical history is significant for migraine headaches and migraines in the family. There is no history of cancer, cluster headaches, hypertension, immunosuppression, obesity, pseudotumor cerebri, recent head traumas, sinus disease or TMJ.   Rash   This is a new problem. The current episode started in the past 7 days. The problem is unchanged. Location: both hands. The rash is characterized by pain (cracking). She was exposed to nothing. Associated symptoms include congestion and rhinorrhea. Pertinent negatives include no anorexia, cough, diarrhea, eye pain, facial edema, fatigue, fever, joint pain, nail changes, shortness of breath, sore throat or vomiting. Past treatments  include nothing. The treatment provided no relief. There is no history of allergies, asthma, eczema or varicella.   Insomnia   This is a chronic problem. The current episode started more than 1 year ago. The problem occurs rarely. The problem has been unchanged. Associated symptoms include congestion, headaches, joint swelling (feet and hands), myalgias (right trapezius), nausea, neck pain, a rash and urinary symptoms. Pertinent negatives include no abdominal pain, anorexia, arthralgias, change in bowel habit, chest pain, chills, coughing, diaphoresis, fatigue, fever, numbness, sore throat, swollen glands, vertigo, visual change, vomiting or weakness. The symptoms are aggravated by stress. Treatments tried: ambien. The treatment provided moderate relief.      Pt's former boyfriend  a few weeks ago and she thinks the he over dosed and is stressed.    Pt goes to dentist later this week for toothache.  The following portions of the patient's history were reviewed and updated as appropriate: allergies, current medications, past family history, past medical history, past social history, past surgical history and problem list.    Past Medical History:   Diagnosis Date   • Fracture of lumbar vertebra     age 14   • Fractures    • History of severe pre-eclampsia    • Hypertension    • Kidney infection    • Kidney stone    • Migraine    • Preeclampsia, severe    • Seizures     one seizure post partum period preeclampsia   • Urinary tract infection      Past Surgical History:   Procedure Laterality Date   • BREAST AUGMENTATION Bilateral    •  SECTION      x 2-   ,    • COLONOSCOPY  2005    bleeding, attempt at EGD unsuccessful-polyps found [facility]   • KIDNEY STONE SURGERY     • WISDOM TOOTH EXTRACTION       Allergies   Allergen Reactions   • Benadryl [Diphenhydramine] Shortness Of Breath     Heart racing, muscle cramping   • Vancomycin Shortness Of Breath     Red man syndrome   • Compazine  [Prochlorperazine] Other (See Comments)     Restless and agitation   • Reglan [Metoclopramide] Other (See Comments)     agitation   • Toradol [Ketorolac Tromethamine] Other (See Comments)     Heart racing and increased BP   • Triptans Other (See Comments)     restless     Family History   Problem Relation Age of Onset   • Arthritis Mother    • Hypertension Mother    • Depression Mother    • Cancer Father      throat   • Diabetes Father    • Hypertension Father    • Heart disease Maternal Grandmother    • Hypertension Maternal Grandfather    • No Known Problems Brother    • Mitochondrial disorder Son    • Neutropenia Son    • Anemia Son      sideroblastic   • Hypertension Maternal Aunt    • Thyroid disease Maternal Aunt    • Migraines Maternal Aunt    • Depression Maternal Aunt    • Arthritis Paternal Grandmother    • Cancer Paternal Grandmother      skin   • Heart disease Paternal Grandmother      stents   • Hyperlipidemia Paternal Grandfather    • Hypertension Paternal Grandfather    • Diabetes Paternal Grandfather    • Neutropenia Daughter    • Anemia Daughter      sideroblastic   • No Known Problems Brother    • No Known Problems Brother      Social History     Social History   • Marital status: Single     Spouse name: N/A   • Number of children: N/A   • Years of education: N/A     Occupational History   • Not on file.     Social History Main Topics   • Smoking status: Never Smoker   • Smokeless tobacco: Never Used   • Alcohol use No   • Drug use: No   • Sexual activity: Yes     Partners: Male     Birth control/ protection: Condom      Comment:      Other Topics Concern   • Not on file     Social History Narrative    Working part time       Review of Systems   Constitutional: Negative.  Negative for chills, diaphoresis, fatigue, fever and weight loss.   HENT: Positive for congestion, rhinorrhea and sinus pressure. Negative for ear pain, hearing loss, nosebleeds, postnasal drip, sneezing, sore throat and  tinnitus.    Eyes: Negative.  Negative for blurred vision, photophobia, pain, redness and itching.   Respiratory: Negative.  Negative for cough, shortness of breath and wheezing.    Cardiovascular: Negative.  Negative for chest pain and palpitations.   Gastrointestinal: Positive for nausea. Negative for abdominal pain, anorexia, change in bowel habit, constipation, diarrhea and vomiting.   Endocrine: Negative.  Negative for cold intolerance and heat intolerance.   Genitourinary: Negative.  Negative for dysuria, frequency, hematuria and urgency.   Musculoskeletal: Positive for joint swelling (feet and hands), myalgias (right trapezius) and neck pain. Negative for arthralgias, back pain and joint pain.   Skin: Positive for rash. Negative for color change and nail changes.   Allergic/Immunologic: Negative.  Negative for environmental allergies.   Neurological: Positive for headaches. Negative for dizziness, vertigo, tingling, seizures, syncope, weakness, light-headedness, numbness and loss of balance.   Hematological: Negative.  Negative for adenopathy. Does not bruise/bleed easily.   Psychiatric/Behavioral: Negative for dysphoric mood. The patient is nervous/anxious. The patient does not have insomnia.        Objective   Physical Exam   Constitutional: She is oriented to person, place, and time. She appears well-developed.   HENT:   Head: Normocephalic.   Right Ear: External ear normal.   Left Ear: External ear normal.   Nose: Nose normal.   Eyes: Conjunctivae and EOM are normal. Pupils are equal, round, and reactive to light.   Neck: Normal range of motion. Neck supple. No thyroid mass and no thyromegaly present.   Cardiovascular: Normal rate and regular rhythm.    No murmur heard.  Pulmonary/Chest: Effort normal and breath sounds normal. No respiratory distress. She has no decreased breath sounds. She has no wheezes. She has no rhonchi. She has no rales. She exhibits no tenderness.   Abdominal: Soft. Bowel sounds  are normal. There is no tenderness.   Musculoskeletal: Normal range of motion.   Neurological: She is alert and oriented to person, place, and time.   Skin: Skin is warm and dry.   Dry skin on hands with some cracks   Psychiatric: She has a normal mood and affect. Her behavior is normal.   Nursing note and vitals reviewed.      Assessment/Plan   Marcia was seen today for med management, cracking hands and urine odor.    Diagnoses and all orders for this visit:    Rash of hands    Migraine with aura and without status migrainosus, not intractable  -     Discontinue: butalbital-acetaminophen-caffeine (ESGIC) -40 MG per tablet; Take 1 tablet by mouth Every 4 (Four) Hours As Needed for Headache.  -     Discontinue: butalbital-acetaminophen-caffeine (ESGIC) -40 MG per tablet; Take 1 tablet by mouth Every 4 (Four) Hours As Needed for Headache.  -     butalbital-acetaminophen-caffeine (ESGIC) -40 MG per tablet; Take 1 tablet by mouth Every 4 (Four) Hours As Needed for Headache.    Insomnia, unspecified type  -     Discontinue: zolpidem (AMBIEN) 10 MG tablet; Take 1 tablet by mouth At Night As Needed for Sleep.  -     Discontinue: zolpidem (AMBIEN) 10 MG tablet; Take 1 tablet by mouth At Night As Needed for Sleep.  -     zolpidem (AMBIEN) 10 MG tablet; Take 1 tablet by mouth At Night As Needed for Sleep.    Abnormal urine odor  -     POCT urinalysis dipstick, automated  -     Urine Culture - Urine, Urine, Clean Catch    Encounter for long-term current use of medication  -     Urine Drug Screen - Urine, Clean Catch    Stress  -     Ambulatory Referral to Behavioral Health    Other orders  -     Discontinue: hydrocortisone-artificial tears; Apply sparingly bid to hands for 1 week  -     Discontinue: hydrocortisone-artificial tears; Apply sparingly bid to hands for 1 week      Scrip for eucerin with hydrocortisone 2% miss linked to artificial tears, will call to pharmacy-30 gm use bid for 1 week              Current Outpatient Prescriptions:   •  artificial tears (REFRESH LACRI-LUBE) ointment ophthalmic ointment, Administer  to both eyes Every 1 (One) Hour As Needed (7)., Disp: 7 g, Rfl: 0  •  butalbital-acetaminophen-caffeine (ESGIC) -40 MG per tablet, Take 1 tablet by mouth Every 4 (Four) Hours As Needed for Headache., Disp: 30 tablet, Rfl: 2  •  Magnesium Oxide 400 (240 Mg) MG tablet, Take 1 tablet by mouth Daily., Disp: 30 tablet, Rfl: 3  •  Nutritional Supplements (ENSURE COMPLETE) liquid, Take 1 bottle by mouth Daily., Disp: 30 bottle, Rfl: 1  •  zolpidem (AMBIEN) 10 MG tablet, Take 1 tablet by mouth At Night As Needed for Sleep., Disp: 30 tablet, Rfl: 2    Return in about 3 months (around 6/13/2018), or if symptoms worsen or fail to improve, for Recheck.    Recent Results (from the past 168 hour(s))   POCT urinalysis dipstick, automated    Collection Time: 03/13/18 11:39 AM   Result Value Ref Range    Color Yellow Yellow, Straw, Dark Yellow, Ana    Clarity, UA Clear Clear    Glucose, UA Negative Negative, 1000 mg/dL (3+) mg/dL    Bilirubin Negative Negative    Ketones, UA Negative Negative    Specific Gravity  1.030 1.005 - 1.030    Blood, UA Trace (A) Negative    pH, Urine 6.0 5.0 - 8.0    Protein, POC Negative Negative mg/dL    Urobilinogen, UA Normal Normal    Leukocytes Negative Negative    Nitrite, UA Negative Negative

## 2018-03-14 ENCOUNTER — TELEPHONE (OUTPATIENT)
Dept: INTERNAL MEDICINE | Facility: CLINIC | Age: 33
End: 2018-03-14

## 2018-03-14 NOTE — TELEPHONE ENCOUNTER
Called patient regarding results. She stated that she was out over the weekend partying and it was a one time thing. She was told it would help her relax. Phone cut off. Patient had asked about pregnancy testing. Called back after disconnected and left message that we did not do a pregnancy test.

## 2018-03-14 NOTE — TELEPHONE ENCOUNTER
----- Message from Anai PURDY MD sent at 3/13/2018  9:17 PM EDT -----  Please call the patient regarding her abnormal result. Drug screen not consistent with med list. Pt has amphetamine in her system-not on med list, it also causes insomnia

## 2018-03-14 NOTE — TELEPHONE ENCOUNTER
Mrs Goel has been notified about her results. She states the amphetamine was a one time thing from party this weekend.

## 2018-03-15 ENCOUNTER — TELEPHONE (OUTPATIENT)
Dept: INTERNAL MEDICINE | Facility: CLINIC | Age: 33
End: 2018-03-15

## 2018-03-15 PROBLEM — R89.2 ABNORMAL DRUG SCREEN: Status: ACTIVE | Noted: 2018-03-15

## 2018-03-15 LAB
BACTERIA SPEC AEROBE CULT: ABNORMAL
BACTERIA SPEC AEROBE CULT: ABNORMAL

## 2018-03-15 RX ORDER — NITROFURANTOIN 25; 75 MG/1; MG/1
100 CAPSULE ORAL 2 TIMES DAILY
Qty: 20 CAPSULE | Refills: 0 | OUTPATIENT
Start: 2018-03-15 | End: 2020-01-19

## 2018-03-15 NOTE — TELEPHONE ENCOUNTER
Patient was very upset that she can't have her medications anymore. She was incredulous that it is a state law you can't 'get high as a grown woman'. She attempted to argue that it is not a state law. She tried to claim that since she had run out of her medication at the time she was high it would not affect her prescriptions. Attempted to clarifiy and explain but she was too upset to really understand. Patient kept insisting she had done nothing wrong. Stated she is going to bring a lawsuit because we wont give her medically necessary medication for her migraines desipte using illegal substances.

## 2018-03-15 NOTE — TELEPHONE ENCOUNTER
Called pharmacies to discontinue controlled substances due to recent lab results. Patient did not use the pharmacy on file. She did use [facility] on [address] [place]. Pharmacist stated that she had already picked one up for each prescription. They are closing the prescriptions so that the refills are not available.

## 2018-03-15 NOTE — TELEPHONE ENCOUNTER
----- Message from Anai PURDY MD sent at 3/15/2018  1:04 PM EDT -----  Please call the patient regarding her abnormal result. Pt has e coli bacteria in urine. Macrobid sent to her pharmacy

## 2020-01-12 ENCOUNTER — APPOINTMENT (OUTPATIENT)
Dept: ULTRASOUND IMAGING | Facility: HOSPITAL | Age: 35
End: 2020-01-12

## 2020-01-12 ENCOUNTER — HOSPITAL ENCOUNTER (EMERGENCY)
Facility: HOSPITAL | Age: 35
Discharge: ANOTHER HOSPITAL | End: 2020-01-12
Attending: EMERGENCY MEDICINE | Admitting: EMERGENCY MEDICINE

## 2020-01-12 ENCOUNTER — APPOINTMENT (OUTPATIENT)
Dept: MRI IMAGING | Facility: HOSPITAL | Age: 35
End: 2020-01-12

## 2020-01-12 VITALS
TEMPERATURE: 98.6 F | OXYGEN SATURATION: 100 % | HEART RATE: 126 BPM | HEIGHT: 60 IN | BODY MASS INDEX: 25.52 KG/M2 | SYSTOLIC BLOOD PRESSURE: 153 MMHG | DIASTOLIC BLOOD PRESSURE: 101 MMHG | RESPIRATION RATE: 20 BRPM | WEIGHT: 130 LBS

## 2020-01-12 DIAGNOSIS — Z3A.08 8 WEEKS GESTATION OF PREGNANCY: ICD-10-CM

## 2020-01-12 DIAGNOSIS — L03.114 CELLULITIS OF LEFT HAND: Primary | ICD-10-CM

## 2020-01-12 DIAGNOSIS — L02.519 HAND ABSCESS: ICD-10-CM

## 2020-01-12 DIAGNOSIS — F19.10 IV DRUG ABUSE (HCC): ICD-10-CM

## 2020-01-12 DIAGNOSIS — Z86.79 HISTORY OF ENDOCARDITIS IN ADULTHOOD: ICD-10-CM

## 2020-01-12 DIAGNOSIS — O03.9 FETAL DEMISE DUE TO MISCARRIAGE: ICD-10-CM

## 2020-01-12 LAB
ABO GROUP BLD: NORMAL
ALBUMIN SERPL-MCNC: 3.6 G/DL (ref 3.5–5.2)
ALBUMIN/GLOB SERPL: 1 G/DL
ALP SERPL-CCNC: 55 U/L (ref 39–117)
ALT SERPL W P-5'-P-CCNC: 79 U/L (ref 1–33)
AMORPH URATE CRY URNS QL MICRO: ABNORMAL /HPF
AMPHET+METHAMPHET UR QL: POSITIVE
AMPHETAMINES UR QL: POSITIVE
ANION GAP SERPL CALCULATED.3IONS-SCNC: 11 MMOL/L (ref 5–15)
AST SERPL-CCNC: 70 U/L (ref 1–32)
BACTERIA UR QL AUTO: ABNORMAL /HPF
BARBITURATES UR QL SCN: NEGATIVE
BASOPHILS # BLD AUTO: 0.02 10*3/MM3 (ref 0–0.2)
BASOPHILS NFR BLD AUTO: 0.2 % (ref 0–1.5)
BENZODIAZ UR QL SCN: NEGATIVE
BILIRUB SERPL-MCNC: 0.7 MG/DL (ref 0.2–1.2)
BILIRUB UR QL STRIP: ABNORMAL
BUN BLD-MCNC: 11 MG/DL (ref 6–20)
BUN/CREAT SERPL: 15.9 (ref 7–25)
BUPRENORPHINE SERPL-MCNC: NEGATIVE NG/ML
CALCIUM SPEC-SCNC: 8.9 MG/DL (ref 8.6–10.5)
CANNABINOIDS SERPL QL: NEGATIVE
CHLORIDE SERPL-SCNC: 98 MMOL/L (ref 98–107)
CLARITY UR: ABNORMAL
CO2 SERPL-SCNC: 23 MMOL/L (ref 22–29)
COCAINE UR QL: NEGATIVE
COLOR UR: ABNORMAL
CREAT BLD-MCNC: 0.69 MG/DL (ref 0.57–1)
D-LACTATE SERPL-SCNC: 0.6 MMOL/L (ref 0.5–2)
DEPRECATED RDW RBC AUTO: 48.1 FL (ref 37–54)
EOSINOPHIL # BLD AUTO: 0.09 10*3/MM3 (ref 0–0.4)
EOSINOPHIL NFR BLD AUTO: 0.9 % (ref 0.3–6.2)
ERYTHROCYTE [DISTWIDTH] IN BLOOD BY AUTOMATED COUNT: 14.2 % (ref 12.3–15.4)
GFR SERPL CREATININE-BSD FRML MDRD: 96 ML/MIN/1.73
GLOBULIN UR ELPH-MCNC: 3.6 GM/DL
GLUCOSE BLD-MCNC: 92 MG/DL (ref 65–99)
GLUCOSE UR STRIP-MCNC: NEGATIVE MG/DL
HCG INTACT+B SERPL-ACNC: NORMAL MIU/ML
HCT VFR BLD AUTO: 34.5 % (ref 34–46.6)
HGB BLD-MCNC: 11.4 G/DL (ref 12–15.9)
HGB UR QL STRIP.AUTO: ABNORMAL
HOLD SPECIMEN: NORMAL
HOLD SPECIMEN: NORMAL
HYALINE CASTS UR QL AUTO: ABNORMAL /LPF
IMM GRANULOCYTES # BLD AUTO: 0.04 10*3/MM3 (ref 0–0.05)
IMM GRANULOCYTES NFR BLD AUTO: 0.4 % (ref 0–0.5)
KETONES UR QL STRIP: ABNORMAL
LEUKOCYTE ESTERASE UR QL STRIP.AUTO: ABNORMAL
LYMPHOCYTES # BLD AUTO: 1.46 10*3/MM3 (ref 0.7–3.1)
LYMPHOCYTES NFR BLD AUTO: 14.5 % (ref 19.6–45.3)
MAGNESIUM SERPL-MCNC: 1.8 MG/DL (ref 1.6–2.6)
MCH RBC QN AUTO: 30.3 PG (ref 26.6–33)
MCHC RBC AUTO-ENTMCNC: 33 G/DL (ref 31.5–35.7)
MCV RBC AUTO: 91.8 FL (ref 79–97)
METHADONE UR QL SCN: NEGATIVE
MONOCYTES # BLD AUTO: 1 10*3/MM3 (ref 0.1–0.9)
MONOCYTES NFR BLD AUTO: 9.9 % (ref 5–12)
NEUTROPHILS # BLD AUTO: 7.48 10*3/MM3 (ref 1.7–7)
NEUTROPHILS NFR BLD AUTO: 74.1 % (ref 42.7–76)
NITRITE UR QL STRIP: NEGATIVE
NRBC BLD AUTO-RTO: 0 /100 WBC (ref 0–0.2)
NUMBER OF DOSES: NORMAL
OPIATES UR QL: POSITIVE
OXYCODONE UR QL SCN: NEGATIVE
PCP UR QL SCN: NEGATIVE
PH UR STRIP.AUTO: <=5 [PH] (ref 5–8)
PLATELET # BLD AUTO: 203 10*3/MM3 (ref 140–450)
PMV BLD AUTO: 9.4 FL (ref 6–12)
POTASSIUM BLD-SCNC: 3.2 MMOL/L (ref 3.5–5.2)
PROCALCITONIN SERPL-MCNC: 22.67 NG/ML (ref 0.1–0.25)
PROPOXYPH UR QL: NEGATIVE
PROT SERPL-MCNC: 7.2 G/DL (ref 6–8.5)
PROT UR QL STRIP: ABNORMAL
RBC # BLD AUTO: 3.76 10*6/MM3 (ref 3.77–5.28)
RBC # UR: ABNORMAL /HPF
REF LAB TEST METHOD: ABNORMAL
RH BLD: POSITIVE
SODIUM BLD-SCNC: 132 MMOL/L (ref 136–145)
SP GR UR STRIP: 1.02 (ref 1–1.03)
SQUAMOUS #/AREA URNS HPF: ABNORMAL /HPF
TRICYCLICS UR QL SCN: NEGATIVE
UROBILINOGEN UR QL STRIP: ABNORMAL
WBC NRBC COR # BLD: 10.09 10*3/MM3 (ref 3.4–10.8)
WBC UR QL AUTO: ABNORMAL /HPF
WHOLE BLOOD HOLD SPECIMEN: NORMAL
WHOLE BLOOD HOLD SPECIMEN: NORMAL

## 2020-01-12 PROCEDURE — 83735 ASSAY OF MAGNESIUM: CPT | Performed by: FAMILY MEDICINE

## 2020-01-12 PROCEDURE — 86901 BLOOD TYPING SEROLOGIC RH(D): CPT | Performed by: PHYSICIAN ASSISTANT

## 2020-01-12 PROCEDURE — 85025 COMPLETE CBC W/AUTO DIFF WBC: CPT | Performed by: PHYSICIAN ASSISTANT

## 2020-01-12 PROCEDURE — 87040 BLOOD CULTURE FOR BACTERIA: CPT | Performed by: EMERGENCY MEDICINE

## 2020-01-12 PROCEDURE — 81001 URINALYSIS AUTO W/SCOPE: CPT | Performed by: EMERGENCY MEDICINE

## 2020-01-12 PROCEDURE — 96367 TX/PROPH/DG ADDL SEQ IV INF: CPT

## 2020-01-12 PROCEDURE — 76817 TRANSVAGINAL US OBSTETRIC: CPT

## 2020-01-12 PROCEDURE — 84702 CHORIONIC GONADOTROPIN TEST: CPT | Performed by: PHYSICIAN ASSISTANT

## 2020-01-12 PROCEDURE — 25010000002 CEFTRIAXONE PER 250 MG: Performed by: PHYSICIAN ASSISTANT

## 2020-01-12 PROCEDURE — 83605 ASSAY OF LACTIC ACID: CPT | Performed by: EMERGENCY MEDICINE

## 2020-01-12 PROCEDURE — 96365 THER/PROPH/DIAG IV INF INIT: CPT

## 2020-01-12 PROCEDURE — 25010000002 PIPERACILLIN SOD-TAZOBACTAM PER 1 G: Performed by: PHYSICIAN ASSISTANT

## 2020-01-12 PROCEDURE — 86900 BLOOD TYPING SEROLOGIC ABO: CPT | Performed by: PHYSICIAN ASSISTANT

## 2020-01-12 PROCEDURE — 25010000002 VANCOMYCIN 10 G RECONSTITUTED SOLUTION: Performed by: PHYSICIAN ASSISTANT

## 2020-01-12 PROCEDURE — 80306 DRUG TEST PRSMV INSTRMNT: CPT | Performed by: FAMILY MEDICINE

## 2020-01-12 PROCEDURE — 96375 TX/PRO/DX INJ NEW DRUG ADDON: CPT

## 2020-01-12 PROCEDURE — 99282 EMERGENCY DEPT VISIT SF MDM: CPT | Performed by: ORTHOPAEDIC SURGERY

## 2020-01-12 PROCEDURE — 80053 COMPREHEN METABOLIC PANEL: CPT | Performed by: PHYSICIAN ASSISTANT

## 2020-01-12 PROCEDURE — 25010000002 LORAZEPAM PER 2 MG

## 2020-01-12 PROCEDURE — 84145 PROCALCITONIN (PCT): CPT | Performed by: PHYSICIAN ASSISTANT

## 2020-01-12 PROCEDURE — 87086 URINE CULTURE/COLONY COUNT: CPT | Performed by: EMERGENCY MEDICINE

## 2020-01-12 PROCEDURE — 99284 EMERGENCY DEPT VISIT MOD MDM: CPT

## 2020-01-12 RX ORDER — SODIUM CHLORIDE 0.9 % (FLUSH) 0.9 %
10 SYRINGE (ML) INJECTION AS NEEDED
Status: DISCONTINUED | OUTPATIENT
Start: 2020-01-12 | End: 2020-01-13 | Stop reason: HOSPADM

## 2020-01-12 RX ORDER — HYDROCODONE BITARTRATE AND ACETAMINOPHEN 5; 325 MG/1; MG/1
2 TABLET ORAL ONCE
Status: COMPLETED | OUTPATIENT
Start: 2020-01-12 | End: 2020-01-12

## 2020-01-12 RX ORDER — HYDROCODONE BITARTRATE AND ACETAMINOPHEN 5; 325 MG/1; MG/1
2 TABLET ORAL ONCE
Status: DISCONTINUED | OUTPATIENT
Start: 2020-01-12 | End: 2020-01-13 | Stop reason: HOSPADM

## 2020-01-12 RX ORDER — LORAZEPAM 2 MG/ML
INJECTION INTRAMUSCULAR
Status: COMPLETED
Start: 2020-01-12 | End: 2020-01-12

## 2020-01-12 RX ORDER — LORAZEPAM 2 MG/ML
1 INJECTION INTRAMUSCULAR ONCE
Status: COMPLETED | OUTPATIENT
Start: 2020-01-12 | End: 2020-01-12

## 2020-01-12 RX ADMIN — SODIUM CHLORIDE 1000 ML: 9 INJECTION, SOLUTION INTRAVENOUS at 16:30

## 2020-01-12 RX ADMIN — HYDROCODONE BITARTRATE AND ACETAMINOPHEN 2 TABLET: 5; 325 TABLET ORAL at 18:40

## 2020-01-12 RX ADMIN — SODIUM CHLORIDE 1000 ML: 9 INJECTION, SOLUTION INTRAVENOUS at 20:40

## 2020-01-12 RX ADMIN — TAZOBACTAM SODIUM AND PIPERACILLIN SODIUM 3.38 G: 375; 3 INJECTION, SOLUTION INTRAVENOUS at 23:33

## 2020-01-12 RX ADMIN — CEFTRIAXONE 2 G: 2 INJECTION, POWDER, FOR SOLUTION INTRAMUSCULAR; INTRAVENOUS at 15:24

## 2020-01-12 RX ADMIN — LORAZEPAM 1 MG: 2 INJECTION INTRAMUSCULAR; INTRAVENOUS at 23:01

## 2020-01-12 RX ADMIN — MUPIROCIN 1 APPLICATION: 20 OINTMENT TOPICAL at 16:36

## 2020-01-12 RX ADMIN — LORAZEPAM 1 MG: 2 INJECTION INTRAMUSCULAR at 23:01

## 2020-01-12 RX ADMIN — VANCOMYCIN HYDROCHLORIDE 1250 MG: 10 INJECTION, POWDER, LYOPHILIZED, FOR SOLUTION INTRAVENOUS at 16:36

## 2020-01-12 NOTE — ED PROVIDER NOTES
Subjective   Marcia Goel is a 34 y.o. female who presents to the ED with c/o cellulitis. She states that she has been experiencing erythema, pain, and swelling to her left arm and hand. She also complains of some erythema on her right arm. The patient notes that her symptoms presented after injecting Heroin and Meth to her left arm. She states that her last injection was yesterday. The patient states that she is 11 weeks pregnant. She complains of increased discharge and hematuria. She denies vaginal bleeding. This is the patient's fifth pregnancy and she reports one miscarriage. She notes that she is allergic to Vancomycin. There are no other acute complaints at this time.        History provided by:  Patient  Hand Pain   Severity:  Moderate  Onset quality:  Sudden  Timing:  Constant  Progression:  Worsening  Chronicity:  New      Review of Systems   Genitourinary: Positive for hematuria and vaginal discharge. Negative for vaginal bleeding.   Skin: Positive for color change.   All other systems reviewed and are negative.      Past Medical History:   Diagnosis Date   • Abnormal drug screen 3/15/2018   • Endocarditis    • Fracture of lumbar vertebra (CMS/[facility])     age 14   • Fractures    • History of severe pre-eclampsia 2007   • Hypertension    • Kidney infection    • Kidney stone    • Migraine    • Preeclampsia, severe    • Seizures (CMS/[facility]) 2007    one seizure post partum period preeclampsia   • Urinary tract infection        Allergies   Allergen Reactions   • Benadryl [Diphenhydramine] Shortness Of Breath     Heart racing, muscle cramping   • Vancomycin Shortness Of Breath     Red man syndrome   • Compazine [Prochlorperazine] Other (See Comments)     Restless and agitation   • Reglan [Metoclopramide] Other (See Comments)     agitation   • Toradol [Ketorolac Tromethamine] Other (See Comments)     Heart racing and increased BP   • Triptans Other (See Comments)     restless       Past Surgical History:    Procedure Laterality Date   • BREAST AUGMENTATION Bilateral    •  SECTION      x 2-   ,    • COLONOSCOPY  2005    bleeding, attempt at EGD unsuccessful-polyps found [facility]   • KIDNEY STONE SURGERY     • WISDOM TOOTH EXTRACTION         Family History   Problem Relation Age of Onset   • Arthritis Mother    • Hypertension Mother    • Depression Mother    • Cancer Father         throat   • Diabetes Father    • Hypertension Father    • Heart disease Maternal Grandmother    • Hypertension Maternal Grandfather    • No Known Problems Brother    • Mitochondrial disorder Son    • Neutropenia Son    • Anemia Son         sideroblastic   • Hypertension Maternal Aunt    • Thyroid disease Maternal Aunt    • Migraines Maternal Aunt    • Depression Maternal Aunt    • Arthritis Paternal Grandmother    • Cancer Paternal Grandmother         skin   • Heart disease Paternal Grandmother         stents   • Hyperlipidemia Paternal Grandfather    • Hypertension Paternal Grandfather    • Diabetes Paternal Grandfather    • Neutropenia Daughter    • Anemia Daughter         sideroblastic   • No Known Problems Brother    • No Known Problems Brother        Social History     Socioeconomic History   • Marital status: Single     Spouse name: Not on file   • Number of children: Not on file   • Years of education: Not on file   • Highest education level: Not on file   Tobacco Use   • Smoking status: Former Smoker   • Smokeless tobacco: Never Used   Substance and Sexual Activity   • Alcohol use: No   • Drug use: Yes     Comment: HEROIN AND METH- LAST USE YESTERDAY    • Sexual activity: Yes     Partners: Male     Birth control/protection: Condom     Comment:    Social History Narrative    Working part time         Objective   Physical Exam   Constitutional: She is oriented to person, place, and time. She appears well-developed and well-nourished. No distress.   The patient is nontoxic appearing.   HENT:   Head:  Normocephalic and atraumatic.   Nose: Nose normal.   Eyes: Conjunctivae are normal. No scleral icterus.   Neck: Normal range of motion. Neck supple.   Cardiovascular: Normal rate, regular rhythm and normal heart sounds.   Pulmonary/Chest: Effort normal and breath sounds normal. No respiratory distress.   Abdominal: Soft. There is no tenderness.   Musculoskeletal: Normal range of motion.   Neurological: She is alert and oriented to person, place, and time.   Skin: Skin is warm and dry. There is erythema.   The skin is warm, pink, and dry. The left dorsal hand has multiple puncture wounds. It is red, warm, and tender to touch. The cellulitis is up the dorsal aspect of the arm. No well defined abscess. Fingers are mildly edematous. The palm side of the hand is spared. Mild epitrochlear tenderness on the left. The right side had several puncture wounds with early cellulitis noted.   Psychiatric: She has a normal mood and affect. Her behavior is normal.   Nursing note and vitals reviewed.      Procedures         ED Course  ED Course as of Jan 12 2334   Sun Jan 12, 2020   1507 Laith Solitario spoke to the pharmacy.     [SK]   1703 Laith Solitario paged DIOR Randolph.    [SK]   1705 Laith Solitario spoke to DIOR Randolph.    [SK]   1756 Laith Solitario paged DIOR Randolph.    [SK]   1757 Laith Solitario spoke to DIOR Randolph.    [SK]   1802 Laith Solitario spoke to Dr. Márquez, hospitalist.     [SK]   1856 Laith Solitario spoke to the  emergency department, who say that they are on divert.    [SK]   1857 Laith Solitario spoke to Dr. Vegas, hand surgeon.    [SK]   1907 Laith Solitario spoke to [facility].    [SK]   1920 Laith Solitario spoke to Dr. Owens, orthopedic surgeon.     [SK]   1921 Laith Solitario spoke to Dr. Carr, orthopedic surgeon, who states that a hand surgeon needs to be consulted.    [SK]   1937 Spoke to [facility] in [place].  They are concerned that the patient is pregnant  and they have no OB services there.  They suggested that I call [facility] transfer service.    [ERICKA]   1938 Spoke to [facility]'s transfer service and had them page the hand surgeon on-call.    [ERICKA]   1955 Laith Solitario spoke to the [facility] transfer services, who states that the hand surgeon on call declines transfer of the patient due to their ER being on divert.    [SK]   1957 Laith Solitario spoke to the [facility] in [place], who declined transfer of the patient to their facility.    [SK]   2002 Laith Solitario spoke to [facility] in [place], who will page the hospitalist. They state that they have OB and hand surgery services there.    [SK]   2048 Laith Solitario spoke to Dr. Shaikh, hand surgeon at [facility], who agrees to consult. Dr. Francisca Shanks, hospitalist, will be the admitting physician.    [ERICKA]   2048 Spoke to Dr. Shaikh hand surgeon on-call for [facility] in [place].  He was willing to have a conversation about transfer but felt that we should try harder here in [place] to treat this patient.    [ERICKA]   2153 I spoke to Dr. Turner again discussed the multiple conversations with attempt to transfer the patient with no success.  She is agreed to admit the patient will try to transfer the patient tomorrow.  We have added Zosyn to the current antibiotic regiment.  Getting an MRI of the left upper extremity.    [ERICKA]   2307 Dr. Carr is come and seen and evaluated the patient here in the emergency department.  He has coordinated transfer to [facility] Dr. Lyon is the hand surgeon on-call accepting the patient.    [ERICKA]   2313 I updated the patient on the most recent care and transfer.  She is in agreements.  I contacted Dr. Turner to notify her.    [ERICKA]      ED Course User Index  [ERICKA] Jacinto Solitario PA  [SK] Juliana Hutchins     Recent Results (from the past 24 hour(s))   Urinalysis With Culture If Indicated -  Urine, Clean Catch    Collection Time: 01/12/20  2:36 PM   Result Value Ref Range    Color, UA Orange (A) Yellow, Straw    Appearance, UA Turbid (A) Clear    pH, UA <=5.0 5.0 - 8.0    Specific Gravity, UA 1.022 1.001 - 1.030    Glucose, UA Negative Negative    Ketones, UA Trace (A) Negative    Bilirubin, UA Small (1+) (A) Negative    Blood, UA Large (3+) (A) Negative    Protein,  mg/dL (2+) (A) Negative    Leuk Esterase, UA Small (1+) (A) Negative    Nitrite, UA Negative Negative    Urobilinogen, UA 1.0 E.U./dL 0.2 - 1.0 E.U./dL   Urinalysis, Microscopic Only - Urine, Clean Catch    Collection Time: 01/12/20  2:36 PM   Result Value Ref Range    RBC, UA Too Numerous to Count (A) None Seen, 0-2 /HPF    WBC, UA 13-20 (A) None Seen, 0-2 /HPF    Bacteria, UA 4+ (A) None Seen, Trace /HPF    Squamous Epithelial Cells, UA 7-12 (A) None Seen, 0-2 /HPF    Hyaline Casts, UA 0-6 0 - 6 /LPF    Amorphous Crystals, UA Moderate/2+ None Seen /HPF    Methodology Manual Light Microscopy    Urine Drug Screen - Urine, Clean Catch    Collection Time: 01/12/20  2:36 PM   Result Value Ref Range    THC, Screen, Urine Negative Negative    Phencyclidine (PCP), Urine Negative Negative    Cocaine Screen, Urine Negative Negative    Methamphetamine, Ur Positive (A) Negative    Opiate Screen Positive (A) Negative    Amphetamine Screen, Urine Positive (A) Negative    Benzodiazepine Screen, Urine Negative Negative    Tricyclic Antidepressants Screen Negative Negative    Methadone Screen, Urine Negative Negative    Barbiturates Screen, Urine Negative Negative    Oxycodone Screen, Urine Negative Negative    Propoxyphene Screen Negative Negative    Buprenorphine, Screen, Urine Negative Negative   Lactic Acid, Plasma    Collection Time: 01/12/20  3:01 PM   Result Value Ref Range    Lactate 0.6 0.5 - 2.0 mmol/L   Light Blue Top    Collection Time: 01/12/20  3:01 PM   Result Value Ref Range    Extra Tube hold for add-on    Green Top (Gel)     Collection Time: 01/12/20  3:01 PM   Result Value Ref Range    Extra Tube Hold for add-ons.    Lavender Top    Collection Time: 01/12/20  3:01 PM   Result Value Ref Range    Extra Tube hold for add-on    Gold Top - SST    Collection Time: 01/12/20  3:01 PM   Result Value Ref Range    Extra Tube Hold for add-ons.    hCG, Quantitative, Pregnancy    Collection Time: 01/12/20  3:01 PM   Result Value Ref Range    HCG Quantitative 14,250.00 mIU/mL   Comprehensive Metabolic Panel    Collection Time: 01/12/20  3:01 PM   Result Value Ref Range    Glucose 92 65 - 99 mg/dL    BUN 11 6 - 20 mg/dL    Creatinine 0.69 0.57 - 1.00 mg/dL    Sodium 132 (L) 136 - 145 mmol/L    Potassium 3.2 (L) 3.5 - 5.2 mmol/L    Chloride 98 98 - 107 mmol/L    CO2 23.0 22.0 - 29.0 mmol/L    Calcium 8.9 8.6 - 10.5 mg/dL    Total Protein 7.2 6.0 - 8.5 g/dL    Albumin 3.60 3.50 - 5.20 g/dL    ALT (SGPT) 79 (H) 1 - 33 U/L    AST (SGOT) 70 (H) 1 - 32 U/L    Alkaline Phosphatase 55 39 - 117 U/L    Total Bilirubin 0.7 0.2 - 1.2 mg/dL    eGFR Non African Amer 96 >60 mL/min/1.73    Globulin 3.6 gm/dL    A/G Ratio 1.0 g/dL    BUN/Creatinine Ratio 15.9 7.0 - 25.0    Anion Gap 11.0 5.0 - 15.0 mmol/L   Procalcitonin    Collection Time: 01/12/20  3:01 PM   Result Value Ref Range    Procalcitonin 22.67 (H) 0.10 - 0.25 ng/mL   CBC Auto Differential    Collection Time: 01/12/20  3:01 PM   Result Value Ref Range    WBC 10.09 3.40 - 10.80 10*3/mm3    RBC 3.76 (L) 3.77 - 5.28 10*6/mm3    Hemoglobin 11.4 (L) 12.0 - 15.9 g/dL    Hematocrit 34.5 34.0 - 46.6 %    MCV 91.8 79.0 - 97.0 fL    MCH 30.3 26.6 - 33.0 pg    MCHC 33.0 31.5 - 35.7 g/dL    RDW 14.2 12.3 - 15.4 %    RDW-SD 48.1 37.0 - 54.0 fl    MPV 9.4 6.0 - 12.0 fL    Platelets 203 140 - 450 10*3/mm3    Neutrophil % 74.1 42.7 - 76.0 %    Lymphocyte % 14.5 (L) 19.6 - 45.3 %    Monocyte % 9.9 5.0 - 12.0 %    Eosinophil % 0.9 0.3 - 6.2 %    Basophil % 0.2 0.0 - 1.5 %    Immature Grans % 0.4 0.0 - 0.5 %    Neutrophils,  Absolute 7.48 (H) 1.70 - 7.00 10*3/mm3    Lymphocytes, Absolute 1.46 0.70 - 3.10 10*3/mm3    Monocytes, Absolute 1.00 (H) 0.10 - 0.90 10*3/mm3    Eosinophils, Absolute 0.09 0.00 - 0.40 10*3/mm3    Basophils, Absolute 0.02 0.00 - 0.20 10*3/mm3    Immature Grans, Absolute 0.04 0.00 - 0.05 10*3/mm3    nRBC 0.0 0.0 - 0.2 /100 WBC   Magnesium    Collection Time: 20  3:01 PM   Result Value Ref Range    Magnesium 1.8 1.6 - 2.6 mg/dL    RhIg Evaluation    Collection Time: 20  6:41 PM   Result Value Ref Range    ABO Type A     RH type Positive    Doses of Rh Immune Globulin    Collection Time: 20  6:41 PM   Result Value Ref Range    Number of Doses       RhIg is not indicated due to the patient's Rh status     Note: In addition to lab results from this visit, the labs listed above may include labs taken at another facility or during a different encounter within the last 24 hours. Please correlate lab times with ED admission and discharge times for further clarification of the services performed during this visit.    US Ob Transvaginal   Preliminary Result   Fetal pole identified with crown-rump length measuring 8   weeks 6 days. No fetal heart rate is identified. Findings concerning for   fetal demise. Correlation with beta-hCG levels is recommended. Continued   follow-up is recommended transvaginally. The left ovary is unremarkable.   The right ovary could not be visualized due to overlying bowel gas.       DICTATED:   2020   EDITED/ls :   2020                MRI Hand Left Without Contrast    (Results Pending)     Vitals:    202   BP:  133/74     BP Location:       Patient Position:       Pulse: (!) 122  (!) 121 114   Resp:       Temp:  100.1 °F (37.8 °C)     TempSrc:  Oral     SpO2: 94%  95% 97%   Weight:       Height:         Medications   sodium chloride 0.9 % flush 10 mL (has no administration in time range)   mupirocin  (BACTROBAN) 2 % nasal ointment (1 application Each Nare Given 1/12/20 1636)   HYDROcodone-acetaminophen (NORCO) 5-325 MG per tablet 2 tablet ( Oral Canceled Entry 1/12/20 1859)   piperacillin-tazobactam (ZOSYN) 3.375 g in iso-osmotic dextrose 50 ml (premix) (3.375 g Intravenous New Bag 1/12/20 2333)   cefTRIAXone (ROCEPHIN) 2 g/100 mL 0.9% NS VTB (ARA) (0 g Intravenous Stopped 1/12/20 1554)   vancomycin 1250 mg/250 mL 0.9% NS IVPB ([facility]) (0 mg Intravenous Stopped 1/12/20 1941)   sodium chloride 0.9 % bolus 1,000 mL (0 mL Intravenous Stopped 1/12/20 1900)   HYDROcodone-acetaminophen (NORCO) 5-325 MG per tablet 2 tablet (2 tablets Oral Given 1/12/20 1840)   sodium chloride 0.9 % bolus 1,000 mL (0 mL Intravenous Stopped 1/12/20 2228)   LORazepam (ATIVAN) injection 1 mg (1 mg Intravenous Given 1/12/20 2301)     ECG/EMG Results (last 24 hours)     ** No results found for the last 24 hours. **        No orders to display                       MDM  Number of Diagnoses or Management Options  8 weeks gestation of pregnancy: new and requires workup  Cellulitis of left hand: new and requires workup  Fetal demise due to miscarriage: new and requires workup  History of endocarditis in adulthood: established and improving  IV drug abuse (CMS/HCC): new and requires workup     Amount and/or Complexity of Data Reviewed  Clinical lab tests: ordered and reviewed  Tests in the radiology section of CPT®: ordered and reviewed  Tests in the medicine section of CPT®: reviewed and ordered  Discuss the patient with other providers: yes    Patient Progress  Patient progress: stable      Final diagnoses:   Cellulitis of left hand   IV drug abuse (CMS/HCC)   History of endocarditis in adulthood   Fetal demise due to miscarriage   8 weeks gestation of pregnancy   Hand abscess       Documentation assistance provided by marian Hutchins.  Information recorded by the scribe was done at my direction and has been verified and validated by me.      Juliana Hutchins  01/12/20 1557       Jacinto Solitario PA  01/12/20 0501

## 2020-01-13 LAB — BACTERIA SPEC AEROBE CULT: NO GROWTH

## 2020-01-13 NOTE — CONSULTS
Orthopaedic Consult Note    Patient Care Team:  Anai Green MD as PCP - General (Family Medicine)    Chief complaint  Left hand infection    Subjective .     History of present illness: 34-year-old female who says that she had a drug relapse with heroin.  She presented to the emergency department after noticing redness in her left arm and hand after injecting heroin and meth into her left arm.  She states her last injection was yesterday.  She is approximately 8 weeks pregnant.  She presented to the hospital about 8 hours ago.  Over the past 8 hours her redness and swelling is gotten worse.  I was asked see the patient in consultation as there are no hand surgeons available.  The ER attending had called  and was on diversion.  Multiple calls to hospitals in [place] and in [place] and none were willing to accept either because they were full or because she was pregnant.  However the patient has fetal demise with no fetal heart rate detected    Review of Systems  Pertinent items are noted in HPI all other systems are reviewed and are negative    History  Family History   Problem Relation Age of Onset   • Arthritis Mother    • Hypertension Mother    • Depression Mother    • Cancer Father         throat   • Diabetes Father    • Hypertension Father    • Heart disease Maternal Grandmother    • Hypertension Maternal Grandfather    • No Known Problems Brother    • Mitochondrial disorder Son    • Neutropenia Son    • Anemia Son         sideroblastic   • Hypertension Maternal Aunt    • Thyroid disease Maternal Aunt    • Migraines Maternal Aunt    • Depression Maternal Aunt    • Arthritis Paternal Grandmother    • Cancer Paternal Grandmother         skin   • Heart disease Paternal Grandmother         stents   • Hyperlipidemia Paternal Grandfather    • Hypertension Paternal Grandfather    • Diabetes Paternal Grandfather    • Neutropenia Daughter    • Anemia Daughter         sideroblastic   • No Known Problems  Brother    • No Known Problems Brother      Social History     Socioeconomic History   • Marital status: Single     Spouse name: Not on file   • Number of children: Not on file   • Years of education: Not on file   • Highest education level: Not on file   Tobacco Use   • Smoking status: Former Smoker   • Smokeless tobacco: Never Used   Substance and Sexual Activity   • Alcohol use: No   • Drug use: Yes     Comment: HEROIN AND METH- LAST USE YESTERDAY    • Sexual activity: Yes     Partners: Male     Birth control/protection: Condom     Comment:    Social History Narrative    Working part time     Past Surgical History:   Procedure Laterality Date   • BREAST AUGMENTATION Bilateral    •  SECTION      x 2-   ,    • COLONOSCOPY  2005    bleeding, attempt at EGD unsuccessful-polyps found [facility]   • KIDNEY STONE SURGERY     • WISDOM TOOTH EXTRACTION       Past Medical History:   Diagnosis Date   • Abnormal drug screen 3/15/2018   • Endocarditis    • Fracture of lumbar vertebra (CMS/[facility])     age 14   • Fractures    • History of severe pre-eclampsia    • Hypertension    • Kidney infection    • Kidney stone    • Migraine    • Preeclampsia, severe    • Seizures (CMS/[facility])     one seizure post partum period preeclampsia   • Urinary tract infection      Allergies   Allergen Reactions   • Benadryl [Diphenhydramine] Shortness Of Breath     Heart racing, muscle cramping   • Vancomycin Shortness Of Breath     Red man syndrome   • Compazine [Prochlorperazine] Other (See Comments)     Restless and agitation   • Reglan [Metoclopramide] Other (See Comments)     agitation   • Toradol [Ketorolac Tromethamine] Other (See Comments)     Heart racing and increased BP   • Triptans Other (See Comments)     restless       Current Facility-Administered Medications:   •  HYDROcodone-acetaminophen (NORCO) 5-325 MG per tablet 2 tablet, 2 tablet, Oral, Once, Leonardo Morgan DO  •  mupirocin (BACTROBAN) 2 %  nasal ointment, , Each Nare, BID, Jacinto Solitario PA, 1 application at 01/12/20 1636  •  piperacillin-tazobactam (ZOSYN) 3.375 g in iso-osmotic dextrose 50 ml (premix), 3.375 g, Intravenous, Once, Jacinto Solitario PA  •  sodium chloride 0.9 % flush 10 mL, 10 mL, Intravenous, PRN, Leonardo Morgan, DO    Current Outpatient Medications:   •  zolpidem (AMBIEN) 10 MG tablet, Take 1 tablet by mouth At Night As Needed for Sleep., Disp: 30 tablet, Rfl: 2  •  artificial tears (REFRESH LACRI-LUBE) ointment ophthalmic ointment, Administer  to both eyes Every 1 (One) Hour As Needed (7)., Disp: 7 g, Rfl: 0  •  butalbital-acetaminophen-caffeine (ESGIC) -40 MG per tablet, Take 1 tablet by mouth Every 4 (Four) Hours As Needed for Headache., Disp: 30 tablet, Rfl: 2  •  Magnesium Oxide 400 (240 Mg) MG tablet, Take 1 tablet by mouth Daily., Disp: 30 tablet, Rfl: 3  •  nitrofurantoin, macrocrystal-monohydrate, (MACROBID) 100 MG capsule, Take 1 capsule by mouth 2 (Two) Times a Day., Disp: 20 capsule, Rfl: 0  •  Nutritional Supplements (ENSURE COMPLETE) liquid, Take 1 bottle by mouth Daily., Disp: 30 bottle, Rfl: 1    Objective     Vital Signs   Temp:  [99.1 °F (37.3 °C)-100.1 °F (37.8 °C)] 100.1 °F (37.8 °C)  Heart Rate:  [] 114  Resp:  [18] 18  BP: (132-163)/(74-95) 133/74      Physical Exam:     GENERAL APPEARANCE: awake, alert & oriented x 3, in no acute distress and well developed, well nourished  Vitals:    01/12/20 2029 01/12/20 2030 01/12/20 2038 01/12/20 2112   BP:  133/74     BP Location:       Patient Position:       Pulse: (!) 122  (!) 121 114   Resp:       Temp:  100.1 °F (37.8 °C)  Comment: NOTIFIED PROVIDER     TempSrc:  Oral     SpO2: 94%  95% 97%   Weight:       Height:         PSYCH: normal affect  HEAD: Normocephalic, without obvious abnormality, atraumatic  EYES: No icterus, corneas clear, PERRLA  CARDIOVASCULAR: pulses palpable and equal bilaterally. Capillary refill less than 2  seconds  LUNGS:  breathing nonlabored  ABDOMEN: Soft, nontender, nondistended  BACK: No C-T- L spine tenderness  EXTREMITIES: no clubbing, cyanosis  MUSCULOSKELETAL: Left arm has erythema and edema in blotchy areas from the elbow distally the most significant swelling and edema is in the hand the thenar space is very erythematous swollen and tender with pain in the first 2 digits with motion.  The hyperthenar space is swollen but without erythema.  There is edema.  Compartments are swollen but soft.  Pulses are 2+ radial artery and capillary refill is brisk    Labs:  Lab Results (last 24 hours)     Procedure Component Value Units Date/Time    Urine Drug Screen - Urine, Clean Catch [[id number]]  (Abnormal) Collected:  01/12/20 1436    Specimen:  Urine, Clean Catch Updated:  01/12/20 2206     THC, Screen, Urine Negative     Phencyclidine (PCP), Urine Negative     Cocaine Screen, Urine Negative     Methamphetamine, Ur Positive     Opiate Screen Positive     Amphetamine Screen, Urine Positive     Benzodiazepine Screen, Urine Negative     Tricyclic Antidepressants Screen Negative     Methadone Screen, Urine Negative     Barbiturates Screen, Urine Negative     Oxycodone Screen, Urine Negative     Propoxyphene Screen Negative     Buprenorphine, Screen, Urine Negative    Narrative:       Cutoff For Drugs Screened:    Amphetamines               500 ng/ml  Barbiturates               200 ng/ml  Benzodiazepines            150 ng/ml  Cocaine                    150 ng/ml  Methadone                  200 ng/ml  Opiates                    100 ng/ml  Phencyclidine               25 ng/ml  THC                            50 ng/ml  Methamphetamine            500 ng/ml  Tricyclic Antidepressants  300 ng/ml  Oxycodone                  100 ng/ml  Propoxyphene               300 ng/ml  Buprenorphine               10 ng/ml    The normal value for all drugs tested is negative. This report includes unconfirmed screening results, with the cutoff  values listed, to be used for medical treatment purposes only.  Unconfirmed results must not be used for non-medical purposes such as employment or legal testing.  Clinical consideration should be applied to any drug of abuse test, particularly when unconfirmed results are used.      Magnesium [[id number]]  (Normal) Collected:  01/12/20 1501    Specimen:  Blood Updated:  01/12/20 2129     Magnesium 1.8 mg/dL     [place] Draw [[id number]] Collected:  01/12/20 1501    Specimen:  Blood Updated:  01/12/20 1615    Narrative:       The following orders were created for panel order [place] Draw.  Procedure                               Abnormality         Status                     ---------                               -----------         ------                     Light Blue Top[[id number]]                                   Final result               Green Top (Gel)[[id number]]                                  Final result               Lavender Top[[id number]]                                     Final result               Gold Top - SST[[id number]]                                   Final result               Green Top (No Gel)[[id number]]                                                            Please view results for these tests on the individual orders.    Green Top (Gel) [[id number]] Collected:  01/12/20 1501    Specimen:  Blood Updated:  01/12/20 1615     Extra Tube Hold for add-ons.     Comment: Auto resulted.       Gold Top - SST [[id number]] Collected:  01/12/20 1501    Specimen:  Blood Updated:  01/12/20 1615     Extra Tube Hold for add-ons.     Comment: Auto resulted.       Light Blue Top [[id number]] Collected:  01/12/20 1501    Specimen:  Blood Updated:  01/12/20 1615     Extra Tube hold for add-on     Comment: Auto resulted       Lavender Top [[id number]] Collected:  01/12/20 1501    Specimen:  Blood Updated:  01/12/20 1615     Extra Tube hold for add-on     Comment: Auto resulted       hCG, Quantitative, Pregnancy  [[id number]] Collected:  01/12/20 1501    Specimen:  Blood Updated:  01/12/20 1608     HCG Quantitative 14,250.00 mIU/mL     Narrative:       HCG Ranges by Gestational Age    3 Weeks         5.8 -    71.2 mIU/mL  4 Weeks         9.5 -     750 mIU/mL  5 Weeks         217 -   7,138 mIU/mL  6 Weeks         158 -  31,795 mIU/mL  7 Weeks       3,697 - 163,563 mIU/mL  8 Weeks      32,065 - 149,571 mIU/mL  9 Weeks      63,803 - 151,410 mIU/mL  10 Weeks     46,509 - 186,977 mIU/mL  12 Weeks     27,832 - 210,612 mIU/mL  14 Weeks     13,950 -  62,530 mIU/mL  15 Weeks     12,039 -  70,971 mIU/mL  16 Weeks      9,040 -  56,451 mIU/mL  17 Weeks      8,175 -  55,868 mIU/mL  18 Weeks      8,099 -  58,176 mIU/mL  Results may be falsely decreased if patient taking Biotin.      Urinalysis, Microscopic Only - Urine, Clean Catch [[id number]]  (Abnormal) Collected:  01/12/20 1436    Specimen:  Urine, Clean Catch Updated:  01/12/20 1600     RBC, UA Too Numerous to Count /HPF      WBC, UA 13-20 /HPF      Bacteria, UA 4+ /HPF      Squamous Epithelial Cells, UA 7-12 /HPF      Hyaline Casts, UA 0-6 /LPF      Amorphous Crystals, UA Moderate/2+ /HPF      Methodology Manual Light Microscopy    Urine Culture - Urine, Urine, Clean Catch [[id number]] Collected:  01/12/20 1436    Specimen:  Urine, Clean Catch Updated:  01/12/20 1600    Procalcitonin [[id number]]  (Abnormal) Collected:  01/12/20 1501    Specimen:  Blood Updated:  01/12/20 1549     Procalcitonin 22.67 ng/mL     Narrative:       Results may be falsely decreased if patient taking Biotin.     Comprehensive Metabolic Panel [[id number]]  (Abnormal) Collected:  01/12/20 1501    Specimen:  Blood Updated:  01/12/20 1546     Glucose 92 mg/dL      BUN 11 mg/dL      Creatinine 0.69 mg/dL      Sodium 132 mmol/L      Potassium 3.2 mmol/L      Chloride 98 mmol/L      CO2 23.0 mmol/L      Calcium 8.9 mg/dL      Total Protein 7.2 g/dL      Albumin 3.60 g/dL      ALT (SGPT) 79 U/L      AST (SGOT) 70 U/L       Alkaline Phosphatase 55 U/L      Total Bilirubin 0.7 mg/dL      eGFR Non African Amer 96 mL/min/1.73      Globulin 3.6 gm/dL      A/G Ratio 1.0 g/dL      BUN/Creatinine Ratio 15.9     Anion Gap 11.0 mmol/L     Narrative:       GFR Normal >60  Chronic Kidney Disease <60  Kidney Failure <15      Lactic Acid, Plasma [[id number]]  (Normal) Collected:  01/12/20 1501    Specimen:  Blood Updated:  01/12/20 1537     Lactate 0.6 mmol/L      Comment: Falsely depressed results may occur on samples drawn from patients receiving N-Acetylcysteine (NAC) or Metamizole.       CBC & Differential [[id number]] Collected:  01/12/20 1501    Specimen:  Blood Updated:  01/12/20 1525    Narrative:       The following orders were created for panel order CBC & Differential.  Procedure                               Abnormality         Status                     ---------                               -----------         ------                     CBC Auto Differential[[id number]]        Abnormal            Final result                 Please view results for these tests on the individual orders.    CBC Auto Differential [[id number]]  (Abnormal) Collected:  01/12/20 1501    Specimen:  Blood Updated:  01/12/20 1525     WBC 10.09 10*3/mm3      RBC 3.76 10*6/mm3      Hemoglobin 11.4 g/dL      Hematocrit 34.5 %      MCV 91.8 fL      MCH 30.3 pg      MCHC 33.0 g/dL      RDW 14.2 %      RDW-SD 48.1 fl      MPV 9.4 fL      Platelets 203 10*3/mm3      Neutrophil % 74.1 %      Lymphocyte % 14.5 %      Monocyte % 9.9 %      Eosinophil % 0.9 %      Basophil % 0.2 %      Immature Grans % 0.4 %      Neutrophils, Absolute 7.48 10*3/mm3      Lymphocytes, Absolute 1.46 10*3/mm3      Monocytes, Absolute 1.00 10*3/mm3      Eosinophils, Absolute 0.09 10*3/mm3      Basophils, Absolute 0.02 10*3/mm3      Immature Grans, Absolute 0.04 10*3/mm3      nRBC 0.0 /100 WBC     Urinalysis With Culture If Indicated - Urine, Clean Catch [[id number]]  (Abnormal) Collected:   01/12/20 1436    Specimen:  Urine, Clean Catch Updated:  01/12/20 1524     Color, UA [place]     Appearance, UA Turbid     pH, UA <=5.0     Specific Gravity, UA 1.022     Glucose, UA Negative     Ketones, UA Trace     Bilirubin, UA Small (1+)     Blood, UA Large (3+)     Protein,  mg/dL (2+)     Leuk Esterase, UA Small (1+)     Nitrite, UA Negative     Urobilinogen, UA 1.0 E.U./dL    Blood Culture - Blood, Arm, Left [[id number]] Collected:  01/12/20 1500    Specimen:  Blood from Arm, Left Updated:  01/12/20 1514    Blood Culture - Blood, Arm, Right [[id number]] Collected:  01/12/20 1450    Specimen:  Blood from Arm, Right Updated:  01/12/20 1513          Imaging:  Patient had a fetal ultrasound with no fetal heart rate        Assessment/Plan   Left arm cellulitis with palmar deep space infections of the thenar and most likely hyperthenar with concern over worsening symptoms and tenosynovitis possibility    Even though [facility] is on diversion I called and personally spoke with the hand surgeon.  Dr. SPRAGUE said that she was willing to accept the patient and that we could transfer to  ER and they will do scanning and surgery there.  The patient had received IV antibiotics here and despite those got worse throughout the day and on my exam has symptoms and signs of significant deep space infection requiring surgical debridement and requiring the expertise of a hand surgeon.  I discussed the patients findings and my recommendations with patient, nursing staff and consulting provider    Presley Carr MD  01/12/20  11:07 PM

## 2020-01-13 NOTE — H&P
[facility] Medicine Services  HISTORY AND PHYSICAL    Patient Name: Marcia Goel  : 1985  MRN: [id number]  Primary Care Physician: Anai Green MD  Date of admission: 2020      Subjective   Subjective     Chief Complaint:  Left hand swelling and pain     HPI:  Marcia Goel is a 34 y.o. female     Review of Systems       All other systems reviewed and are negative.     Personal History     Past Medical History:   Diagnosis Date   • Abnormal drug screen 3/15/2018   • Endocarditis    • Fracture of lumbar vertebra (CMS/HCC)     age 14   • Fractures    • History of severe pre-eclampsia    • Hypertension    • Kidney infection    • Kidney stone    • Migraine    • Preeclampsia, severe    • Seizures (CMS/HCC)     one seizure post partum period preeclampsia   • Urinary tract infection        Past Surgical History:   Procedure Laterality Date   • BREAST AUGMENTATION Bilateral    •  SECTION      x 2-   ,    • COLONOSCOPY      bleeding, attempt at EGD unsuccessful-polyps found [facility]   • KIDNEY STONE SURGERY     • WISDOM TOOTH EXTRACTION         Family History: family history includes Anemia in her daughter and son; Arthritis in her mother and paternal grandmother; Cancer in her father and paternal grandmother; Depression in her maternal aunt and mother; Diabetes in her father and paternal grandfather; Heart disease in her maternal grandmother and paternal grandmother; Hyperlipidemia in her paternal grandfather; Hypertension in her father, maternal aunt, maternal grandfather, mother, and paternal grandfather; Migraines in her maternal aunt; Mitochondrial disorder in her son; Neutropenia in her daughter and son; No Known Problems in her brother, brother, and brother; Thyroid disease in her maternal aunt. Otherwise pertinent FHx was reviewed and unremarkable.     Social History:  reports that she has quit smoking. She has never used smokeless  tobacco. She reports that she has current or past drug history. She reports that she does not drink alcohol.  Social History     Social History Narrative    Working part time       Medications:    Available home medication information reviewed.    (Not in a hospital admission)    Allergies   Allergen Reactions   • Benadryl [Diphenhydramine] Shortness Of Breath     Heart racing, muscle cramping   • Vancomycin Shortness Of Breath     Red man syndrome   • Compazine [Prochlorperazine] Other (See Comments)     Restless and agitation   • Reglan [Metoclopramide] Other (See Comments)     agitation   • Toradol [Ketorolac Tromethamine] Other (See Comments)     Heart racing and increased BP   • Triptans Other (See Comments)     restless       Objective   Objective     Vital Signs:   Temp:  [99.1 °F (37.3 °C)-100.1 °F (37.8 °C)] 100.1 °F (37.8 °C)  Heart Rate:  [] 114  Resp:  [18] 18  BP: (132-163)/(74-95) 133/74        Physical Exam       Results Reviewed:  I have personally reviewed current lab and radiology data.      Results from last 7 days   Lab Units 01/12/20  1501   SODIUM mmol/L 132*   POTASSIUM mmol/L 3.2*   CHLORIDE mmol/L 98   CO2 mmol/L 23.0   BUN mg/dL 11   CREATININE mg/dL 0.69   GLUCOSE mg/dL 92   CALCIUM mg/dL 8.9   ALT (SGPT) U/L 79*   AST (SGOT) U/L 70*   LACTATE mmol/L 0.6   PROCALCITONIN ng/mL 22.67*     Estimated Creatinine Clearance: 90.6 mL/min (by C-G formula based on SCr of 0.69 mg/dL).  Brief Urine Lab Results  (Last result in the past 365 days)      Color   Clarity   Blood   Leuk Est   Nitrite   Protein   CREAT   Urine HCG        01/12/20 1436 Orange Turbid Large (3+) Small (1+) Negative 100 mg/dL (2+)             Imaging Results (Last 24 Hours)     Procedure Component Value Units Date/Time    US Ob Transvaginal [[id number]] Collected:  01/12/20 1745     Updated:  01/12/20 1801    Narrative:       EXAMINATION: US OB TRANSVAGINAL - 01/12/2020     INDICATION: Evaluate pregnancy     TECHNIQUE:  Transvaginal sonographic imaging is obtained of the pelvis in  both the sagittal and transverse planes.     COMPARISON: None.     FINDINGS: The examination is suboptimal with patient having difficult  time tolerating the examination. The right ovary is not visualized due  to overlying bowel gas. The cervix is unremarkable in appearance. The  uterus measures 9.2 x 8.5 x 10.0 cm. There is a gestational sac  identified with fetal pole measuring 8 weeks 6 days. No fetal heart rate  is identified. No yolk sac is identified. Findings concerning for fetal  demise. No myometrial abnormality identified. There is no abnormality  seen within the right adnexa.     The left ovary measures 2.6 x 1.7 x 1.9 cm. Good color flow present. No  abnormality identified. No adnexal mass identified.       Impression:       Fetal pole identified with crown-rump length measuring 8  weeks 6 days. No fetal heart rate is identified. Findings concerning for  fetal demise. Correlation with beta-hCG levels is recommended. Continued  follow-up is recommended transvaginally. The left ovary is unremarkable.  The right ovary could not be visualized due to overlying bowel gas.     DICTATED:   01/12/2020  EDITED/ls :   01/12/2020                     Assessment/Plan   Assessment / Plan     There are no active hospital problems to display for this patient.        Patient transferred to  - not admitted by our service    DVT prophylaxis:      CODE STATUS:    There are no questions and answers to display.       Admission Status:      Electronically signed by JACOB Borges, 01/12/20, 10:44 PM.

## 2020-01-17 LAB
BACTERIA SPEC AEROBE CULT: NORMAL
BACTERIA SPEC AEROBE CULT: NORMAL

## 2020-01-19 ENCOUNTER — HOSPITAL ENCOUNTER (EMERGENCY)
Facility: HOSPITAL | Age: 35
Discharge: HOME | End: 2020-01-19
Attending: EMERGENCY MEDICINE | Admitting: EMERGENCY MEDICINE

## 2020-01-19 VITALS
BODY MASS INDEX: 25.2 KG/M2 | OXYGEN SATURATION: 98 % | WEIGHT: 125 LBS | TEMPERATURE: 98.7 F | DIASTOLIC BLOOD PRESSURE: 89 MMHG | HEART RATE: 108 BPM | SYSTOLIC BLOOD PRESSURE: 146 MMHG | RESPIRATION RATE: 20 BRPM | HEIGHT: 59 IN

## 2020-01-19 DIAGNOSIS — L60.8 SPLINTER HEMORRHAGE OF FINGERNAIL: Primary | ICD-10-CM

## 2020-01-19 DIAGNOSIS — O03.4 INCOMPLETE MISCARRIAGE: ICD-10-CM

## 2020-01-19 DIAGNOSIS — Z87.2 HISTORY OF CELLULITIS: ICD-10-CM

## 2020-01-19 DIAGNOSIS — F19.90 IV DRUG USER: ICD-10-CM

## 2020-01-19 PROCEDURE — 99282 EMERGENCY DEPT VISIT SF MDM: CPT

## 2020-01-19 NOTE — DISCHARGE INSTRUCTIONS
Relation reveals that previous left hand cellulitis has markedly improved.  There is no redness, warmth, or significant soft tissue swelling.  No need for further antibiotics at this time.  Patient was previously referred to [facility] hand specialist, and we recommend close outpatient follow-up.  We have given phone numbers above.  Patient also has incomplete miscarriage with recent ultrasound on 1/12/2020 showing no fetal heart tones.  Recommend first available follow-up with Dr. Rodríguez here at [facility] to set up dilatation and curettage.  Strongly recommend cessation of IV drugs.  Recommend patient to establish care with a primary care physician in the local area for close follow-up.  We will give her a list of accepting physicians.  Return to the ER if any worsening symptoms of swelling, redness, or warmth to the extremities or fever.

## 2020-01-19 NOTE — ED PROVIDER NOTES
"Subjective   This is a 34-year-old female that presents to the ER with concerns of isolated splinter hemorrhages to her left second and third fingernail beds.  She was recently here at our facility on 2020 for left hand cellulitis with history of IV drug use.  She reports relapse with heroin and meth 2 days prior to 2020.  Patient had extensive ER course due to difficulty with transfer for hand surgery.  The midlevel who is in charge of her care spoke to several different facilities, including [facility] in [place] and [facility] in [place].  The hospitalist ultimately accepted patient to our facility and then later transferred her to the [facility] where Dr. Lyon, hand surgeon, accepted the patient.  Patient also says that she is pregnant.  Her last menstrual period was 10/26/2019.  Before she was transferred, she had ultrasound here at [facility] on 20 and was told that there was no fetal heart tones.  According to measurements, fetus was 8 weeks and 6 days.  They had discussed setting up dilatation and curettage, but when patient was transferred, they deferred on that.  She is [id number]. Patient says that once she was transferred to , she was out in a hallway and they \"did not do anything for me\".  Therefore, she left AGAINST MEDICAL ADVICE that afternoon before any further intervention.  Patient says that redness, warmth, and swelling have completely resolved to her left hand.  She does have a couple of splinter hemorrhages to the nailbeds on the left second and third fingers and they have been there for a few days.  She denies any chest pain or shortness of breath.  She does report history of endocarditis and bilateral pneumonia in 2019.  She has continued to use IV drugs and her last use of IV heroin 1 was this evening.  Patient denies any fever or chills.  She advised us that she was contacted by someone at our facility and told that she had " positive blood cultures.  After reviewing epic, 2 sets of blood cultures drawn on 1/12/2020 showed no growth at 5 days.      History provided by:  Patient  Arm Pain   Location:  Left hand pain with recent cellulitis, h/o IV drug user  Severity:  Mild  Duration:  6 days  Chronicity:  Recurrent  Context:  History of admission to our facility on 1/12/2020 for left hand cellulitis with history of IV drug use.  Patient was transferred from her hospitalist service to , where she quickly left AGAINST MEDICAL ADVICE.  Left hand swelling, redness, and warmth have resolved.  Patient does have splinter hemorrhages to the left second and third fingers.  She denies chest pain or shortness of breath.  No other concerns.  Relieved by:  Nothing  Worsened by:  Nothing  Ineffective treatments:  None used.  Associated symptoms: no abdominal pain (Splinter hemorrhages to left 2nd and 3rd nailbeds.), no chest pain, no fatigue, no fever, no myalgias, no nausea, no rash, no shortness of breath, no vomiting and no wheezing    Risk factors:  History of IV drug use      Review of Systems   Constitutional: Negative for appetite change, chills, diaphoresis, fatigue and fever.   Respiratory: Negative for chest tightness, shortness of breath and wheezing.    Cardiovascular: Negative for chest pain, palpitations and leg swelling.   Gastrointestinal: Negative for abdominal pain (Splinter hemorrhages to left 2nd and 3rd nailbeds.), nausea and vomiting.   Genitourinary: Negative for dysuria, flank pain, frequency, pelvic pain and vaginal bleeding.        LMP: 10/26/19.  Patient had transvaginal u/s on 1/12/20 and fetus measured 8 weeks and 6 days but there was no heart beat.   Musculoskeletal: Negative for arthralgias, back pain and myalgias.   Skin: Negative for color change, rash and wound.        Splinter hemorrhages to left 2nd and 3rd fingernail beds.  No other hemorrhages to nail beds on right hand or feet.   Neurological: Negative.         Past Medical History:   Diagnosis Date   • Abnormal drug screen 3/15/2018   • Endocarditis    • Fracture of lumbar vertebra (CMS/[facility])     age 14   • Fractures    • History of severe pre-eclampsia    • Hypertension    • Kidney infection    • Kidney stone    • Migraine    • Preeclampsia, severe    • Seizures (CMS/[facility]) 2007    one seizure post partum period preeclampsia   • Urinary tract infection        Allergies   Allergen Reactions   • Benadryl [Diphenhydramine] Shortness Of Breath     Heart racing, muscle cramping   • Vancomycin Shortness Of Breath     Red man syndrome   • Compazine [Prochlorperazine] Other (See Comments)     Restless and agitation   • Reglan [Metoclopramide] Other (See Comments)     agitation   • Toradol [Ketorolac Tromethamine] Other (See Comments)     Heart racing and increased BP   • Triptans Other (See Comments)     restless       Past Surgical History:   Procedure Laterality Date   • BREAST AUGMENTATION Bilateral    •  SECTION      x 2-   ,    • COLONOSCOPY  2005    bleeding, attempt at EGD unsuccessful-polyps found [facility]   • KIDNEY STONE SURGERY     • WISDOM TOOTH EXTRACTION         Family History   Problem Relation Age of Onset   • Arthritis Mother    • Hypertension Mother    • Depression Mother    • Cancer Father         throat   • Diabetes Father    • Hypertension Father    • Heart disease Maternal Grandmother    • Hypertension Maternal Grandfather    • No Known Problems Brother    • Mitochondrial disorder Son    • Neutropenia Son    • Anemia Son         sideroblastic   • Hypertension Maternal Aunt    • Thyroid disease Maternal Aunt    • Migraines Maternal Aunt    • Depression Maternal Aunt    • Arthritis Paternal Grandmother    • Cancer Paternal Grandmother         skin   • Heart disease Paternal Grandmother         stents   • Hyperlipidemia Paternal Grandfather    • Hypertension Paternal Grandfather    • Diabetes Paternal Grandfather    • Neutropenia  Daughter    • Anemia Daughter         sideroblastic   • No Known Problems Brother    • No Known Problems Brother        Social History     Socioeconomic History   • Marital status: Single     Spouse name: Not on file   • Number of children: Not on file   • Years of education: Not on file   • Highest education level: Not on file   Tobacco Use   • Smoking status: Former Smoker   • Smokeless tobacco: Never Used   Substance and Sexual Activity   • Alcohol use: No   • Drug use: Yes     Comment: HEROIN AND METH- LAST USE YESTERDAY    • Sexual activity: Yes     Partners: Male     Birth control/protection: Condom     Comment:    Social History Narrative    Working part time           Objective   Physical Exam   Constitutional: She is oriented to person, place, and time. She appears well-developed and well-nourished. No distress.   HENT:   Head: Normocephalic and atraumatic.   Nose: Nose normal.   Mouth/Throat: Oropharynx is clear and moist.   Eyes: Pupils are equal, round, and reactive to light. Conjunctivae and EOM are normal. Right conjunctiva is not injected. Right conjunctiva has no hemorrhage. Left conjunctiva is not injected. Left conjunctiva has no hemorrhage. No scleral icterus.   No hemorrhages noted to bilateral eyes.   Neck: Normal range of motion. Neck supple.   Cardiovascular: Regular rhythm, normal heart sounds, intact distal pulses and normal pulses.  No extrasystoles are present. Tachycardia present.   No murmur heard.  No heart murmur appreciated.  No pedal edema to bilateral lower extremities and no edema/swelling to left hand or left upper extremity.   Pulmonary/Chest: Effort normal and breath sounds normal. No accessory muscle usage. No tachypnea. No respiratory distress. She has no decreased breath sounds. She has no wheezes. She has no rhonchi. She has no rales.   Abdominal: Soft. Bowel sounds are normal. She exhibits no distension. There is no tenderness.   Abdomen soft and nontender.  Active  bowel sounds.  Nontender to palpation.  No flank or CVA tenderness.   Musculoskeletal: Normal range of motion. She exhibits no edema.   Lymphadenopathy:     She has no cervical adenopathy.   Neurological: She is alert and oriented to person, place, and time.   Skin: Skin is warm and dry. She is not diaphoretic. No erythema.   No soft tissue swelling to the left hand or left upper extremity.  Patient has multiple track marks to bilateral upper extremities from IV drug use.  No evidence of cellulitis to the left hand.  There is no redness, warmth, or swelling.  Patient does have some evidence of skin peeling on the distal thony of fingertips on the left hand.  She has brisk capillary refill.  No pallor appreciated and no coolness to touch.  Patient does have evidence of splinter hemorrhages to the left second and third  fingernail beds.  There are no splinter hemorrhages to the right hand or feet.   Psychiatric: Her speech is normal and behavior is normal. Thought content normal. Her mood appears anxious. Cognition and memory are normal.   Nursing note and vitals reviewed.      Procedures           ED Course  ED Course as of Jan 19 0228   Sun Jan 19, 2020   0033 Discussed the case in detail with Dr. Johnson.  He also saw and evaluated the patient.    [FC]   0053 Blood cultures x2 sets in epic from 1/12/2020 showed no growth at 5 days.    [FC]   0055 I will page on-call hand surgery at  to discuss the case and see what their recommendations are.    [FC]   0116 Discussed the case with  MDs.  They will return my call.    [FC]   0202 Discussed the case in detail with hand surgery on-call at , .  Without any evidence of cellulitis, he does not recommend any further treatment at this time.  He also question whether patient was neurovascularly intact.  I advised that patient has strong distal pulses and brisk capillary refill.  There is no pallor or coolness to touch.  He said that patient can follow-up in  "their outpatient clinic closely for recheck.  I updated patient on recommendations by hand surgery.  She says that she actually did see the hand specialist at  on 1/12/2020, and her accepting physician was Dr. Lyon.  I will give her his follow-up information.  There is no need for further antibiotics at this time.  Patient is afebrile and exam is improved.  She also is recommended to follow-up with women's care, Dr. Toro due to recent ultrasound revealing fetal demise with no fetal heart tones.  Patient has no vaginal bleeding or abdominal pain at this time.  We will refer her to OB/GYN to set up outpatient dilatation and curettage.  Strongly recommend discontinuation of IV drug abuse.  Patient is medically stable for discharge to home and we will give her all close outpatient follow-up information.  We also will give her follow-up information to establish care with a primary care physician for recheck.  Patient has no heart murmur.  She denies chest pain or shortness of breath.  Exam and vital signs are stable.    [FC]      ED Course User Index  [FC] Charisse Gustafson, SHANEL           No results found for this or any previous visit (from the past 24 hour(s)).  Note: In addition to lab results from this visit, the labs listed above may include labs taken at another facility or during a different encounter within the last 24 hours. Please correlate lab times with ED admission and discharge times for further clarification of the services performed during this visit.    No orders to display     Vitals:    01/19/20 0019   BP: 168/98   BP Location: Left arm   Patient Position: Sitting   Pulse: (!) 124   Resp: 22   Temp: 98.7 °F (37.1 °C)   TempSrc: Oral   SpO2: 97%   Weight: 56.7 kg (125 lb)   Height: 149.9 cm (59\")     Medications - No data to display  ECG/EMG Results (last 24 hours)     ** No results found for the last 24 hours. **        No orders to display                                           MDM    Final " diagnoses:   Splinter hemorrhage of fingernail   History of cellulitis   IV drug user   Incomplete miscarriage            Charisse Gustafson, SHANEL  01/19/20 0224

## 2021-05-20 ENCOUNTER — HOSPITAL ENCOUNTER (EMERGENCY)
Facility: HOSPITAL | Age: 36
Discharge: HOME | End: 2021-05-20
Attending: EMERGENCY MEDICINE | Admitting: EMERGENCY MEDICINE

## 2021-05-20 ENCOUNTER — APPOINTMENT (OUTPATIENT)
Dept: GENERAL RADIOLOGY | Facility: HOSPITAL | Age: 36
End: 2021-05-20

## 2021-05-20 VITALS
RESPIRATION RATE: 19 BRPM | BODY MASS INDEX: 22.58 KG/M2 | TEMPERATURE: 98.3 F | DIASTOLIC BLOOD PRESSURE: 88 MMHG | HEART RATE: 104 BPM | HEIGHT: 60 IN | SYSTOLIC BLOOD PRESSURE: 135 MMHG | WEIGHT: 115 LBS | OXYGEN SATURATION: 96 %

## 2021-05-20 DIAGNOSIS — J40 BRONCHITIS: ICD-10-CM

## 2021-05-20 DIAGNOSIS — H66.002 NON-RECURRENT ACUTE SUPPURATIVE OTITIS MEDIA OF LEFT EAR WITHOUT SPONTANEOUS RUPTURE OF TYMPANIC MEMBRANE: Primary | ICD-10-CM

## 2021-05-20 LAB
ALBUMIN SERPL-MCNC: 4.5 G/DL (ref 3.5–5.2)
ALBUMIN/GLOB SERPL: 1.1 G/DL
ALP SERPL-CCNC: 105 U/L (ref 39–117)
ALT SERPL W P-5'-P-CCNC: 65 U/L (ref 1–33)
ANION GAP SERPL CALCULATED.3IONS-SCNC: 12 MMOL/L (ref 5–15)
AST SERPL-CCNC: 67 U/L (ref 1–32)
B-HCG UR QL: NEGATIVE
BASOPHILS # BLD AUTO: 0.02 10*3/MM3 (ref 0–0.2)
BASOPHILS NFR BLD AUTO: 0.4 % (ref 0–1.5)
BILIRUB SERPL-MCNC: 0.4 MG/DL (ref 0–1.2)
BUN SERPL-MCNC: 13 MG/DL (ref 6–20)
BUN/CREAT SERPL: 16 (ref 7–25)
CALCIUM SPEC-SCNC: 9.7 MG/DL (ref 8.6–10.5)
CHLORIDE SERPL-SCNC: 100 MMOL/L (ref 98–107)
CO2 SERPL-SCNC: 26 MMOL/L (ref 22–29)
CREAT SERPL-MCNC: 0.81 MG/DL (ref 0.57–1)
DEPRECATED RDW RBC AUTO: 60.6 FL (ref 37–54)
EOSINOPHIL # BLD AUTO: 0.14 10*3/MM3 (ref 0–0.4)
EOSINOPHIL NFR BLD AUTO: 3 % (ref 0.3–6.2)
ERYTHROCYTE [DISTWIDTH] IN BLOOD BY AUTOMATED COUNT: 19.9 % (ref 12.3–15.4)
FLUAV RNA RESP QL NAA+PROBE: NOT DETECTED
FLUBV RNA RESP QL NAA+PROBE: NOT DETECTED
GFR SERPL CREATININE-BSD FRML MDRD: 79 ML/MIN/1.73
GLOBULIN UR ELPH-MCNC: 4 GM/DL
GLUCOSE SERPL-MCNC: 100 MG/DL (ref 65–99)
HCT VFR BLD AUTO: 39.1 % (ref 34–46.6)
HGB BLD-MCNC: 11.9 G/DL (ref 12–15.9)
HOLD SPECIMEN: NORMAL
IMM GRANULOCYTES # BLD AUTO: 0.01 10*3/MM3 (ref 0–0.05)
IMM GRANULOCYTES NFR BLD AUTO: 0.2 % (ref 0–0.5)
INTERNAL NEGATIVE CONTROL: NEGATIVE
INTERNAL POSITIVE CONTROL: POSITIVE
LYMPHOCYTES # BLD AUTO: 1.29 10*3/MM3 (ref 0.7–3.1)
LYMPHOCYTES NFR BLD AUTO: 27.9 % (ref 19.6–45.3)
Lab: NORMAL
MCH RBC QN AUTO: 25.7 PG (ref 26.6–33)
MCHC RBC AUTO-ENTMCNC: 30.4 G/DL (ref 31.5–35.7)
MCV RBC AUTO: 84.4 FL (ref 79–97)
MONOCYTES # BLD AUTO: 0.36 10*3/MM3 (ref 0.1–0.9)
MONOCYTES NFR BLD AUTO: 7.8 % (ref 5–12)
NEUTROPHILS NFR BLD AUTO: 2.81 10*3/MM3 (ref 1.7–7)
NEUTROPHILS NFR BLD AUTO: 60.7 % (ref 42.7–76)
NRBC BLD AUTO-RTO: 0 /100 WBC (ref 0–0.2)
NT-PROBNP SERPL-MCNC: 212.7 PG/ML (ref 0–450)
PLAT MORPH BLD: NORMAL
PLATELET # BLD AUTO: 286 10*3/MM3 (ref 140–450)
PMV BLD AUTO: 9.2 FL (ref 6–12)
POTASSIUM SERPL-SCNC: 4.2 MMOL/L (ref 3.5–5.2)
PROT SERPL-MCNC: 8.5 G/DL (ref 6–8.5)
RBC # BLD AUTO: 4.63 10*6/MM3 (ref 3.77–5.28)
RBC MORPH BLD: NORMAL
SARS-COV-2 RNA RESP QL NAA+PROBE: NOT DETECTED
SODIUM SERPL-SCNC: 138 MMOL/L (ref 136–145)
TROPONIN T SERPL-MCNC: <0.01 NG/ML (ref 0–0.03)
WBC # BLD AUTO: 4.63 10*3/MM3 (ref 3.4–10.8)
WBC MORPH BLD: NORMAL
WHOLE BLOOD HOLD SPECIMEN: NORMAL
WHOLE BLOOD HOLD SPECIMEN: NORMAL

## 2021-05-20 PROCEDURE — 99284 EMERGENCY DEPT VISIT MOD MDM: CPT

## 2021-05-20 PROCEDURE — 83880 ASSAY OF NATRIURETIC PEPTIDE: CPT | Performed by: EMERGENCY MEDICINE

## 2021-05-20 PROCEDURE — 81025 URINE PREGNANCY TEST: CPT | Performed by: EMERGENCY MEDICINE

## 2021-05-20 PROCEDURE — 87636 SARSCOV2 & INF A&B AMP PRB: CPT | Performed by: EMERGENCY MEDICINE

## 2021-05-20 PROCEDURE — 84484 ASSAY OF TROPONIN QUANT: CPT | Performed by: EMERGENCY MEDICINE

## 2021-05-20 PROCEDURE — 85007 BL SMEAR W/DIFF WBC COUNT: CPT | Performed by: EMERGENCY MEDICINE

## 2021-05-20 PROCEDURE — 80053 COMPREHEN METABOLIC PANEL: CPT | Performed by: EMERGENCY MEDICINE

## 2021-05-20 PROCEDURE — 85025 COMPLETE CBC W/AUTO DIFF WBC: CPT | Performed by: EMERGENCY MEDICINE

## 2021-05-20 PROCEDURE — 93005 ELECTROCARDIOGRAM TRACING: CPT | Performed by: EMERGENCY MEDICINE

## 2021-05-20 PROCEDURE — 71045 X-RAY EXAM CHEST 1 VIEW: CPT

## 2021-05-20 RX ORDER — SODIUM CHLORIDE 0.9 % (FLUSH) 0.9 %
10 SYRINGE (ML) INJECTION AS NEEDED
Status: DISCONTINUED | OUTPATIENT
Start: 2021-05-20 | End: 2021-05-20 | Stop reason: HOSPADM

## 2021-05-20 RX ORDER — AMOXICILLIN 875 MG/1
875 TABLET, COATED ORAL 2 TIMES DAILY
Qty: 20 TABLET | Refills: 0 | Status: SHIPPED | OUTPATIENT
Start: 2021-05-20 | End: 2021-10-24

## 2021-05-26 LAB
QT INTERVAL: 354 MS
QTC INTERVAL: 442 MS

## 2021-10-24 ENCOUNTER — HOSPITAL ENCOUNTER (EMERGENCY)
Facility: HOSPITAL | Age: 36
Discharge: LEFT BEFORE DISCHARGE | End: 2021-10-24
Attending: EMERGENCY MEDICINE | Admitting: EMERGENCY MEDICINE

## 2021-10-24 VITALS
TEMPERATURE: 98.3 F | HEIGHT: 60 IN | SYSTOLIC BLOOD PRESSURE: 171 MMHG | HEART RATE: 112 BPM | RESPIRATION RATE: 16 BRPM | BODY MASS INDEX: 22.58 KG/M2 | DIASTOLIC BLOOD PRESSURE: 115 MMHG | OXYGEN SATURATION: 99 % | WEIGHT: 115 LBS

## 2021-10-24 DIAGNOSIS — R00.0 TACHYCARDIA: ICD-10-CM

## 2021-10-24 DIAGNOSIS — Z92.89 HISTORY OF RECENT HOSPITALIZATION: ICD-10-CM

## 2021-10-24 DIAGNOSIS — T82.524A DISPLACEMENT OF PERIPHERALLY INSERTED CENTRAL VENOUS CATHETER (PICC) (HCC): ICD-10-CM

## 2021-10-24 DIAGNOSIS — I10 HYPERTENSION, UNSPECIFIED TYPE: Primary | ICD-10-CM

## 2021-10-24 DIAGNOSIS — N39.0 ACUTE UTI: ICD-10-CM

## 2021-10-24 DIAGNOSIS — F19.10 IV DRUG ABUSE (HCC): ICD-10-CM

## 2021-10-24 LAB
BACTERIA UR QL AUTO: ABNORMAL /HPF
BILIRUB UR QL STRIP: NEGATIVE
CLARITY UR: ABNORMAL
COLOR UR: YELLOW
GLUCOSE UR STRIP-MCNC: NEGATIVE MG/DL
HGB UR QL STRIP.AUTO: NEGATIVE
HYALINE CASTS UR QL AUTO: ABNORMAL /LPF
KETONES UR QL STRIP: NEGATIVE
LEUKOCYTE ESTERASE UR QL STRIP.AUTO: ABNORMAL
NITRITE UR QL STRIP: NEGATIVE
PH UR STRIP.AUTO: 6.5 [PH] (ref 5–8)
PROT UR QL STRIP: ABNORMAL
RBC # UR: ABNORMAL /HPF
REF LAB TEST METHOD: ABNORMAL
SP GR UR STRIP: 1.02 (ref 1–1.03)
SQUAMOUS #/AREA URNS HPF: ABNORMAL /HPF
UROBILINOGEN UR QL STRIP: ABNORMAL
WBC UR QL AUTO: ABNORMAL /HPF

## 2021-10-24 PROCEDURE — 99282 EMERGENCY DEPT VISIT SF MDM: CPT

## 2021-10-24 PROCEDURE — 81001 URINALYSIS AUTO W/SCOPE: CPT | Performed by: EMERGENCY MEDICINE

## 2021-10-24 PROCEDURE — 87040 BLOOD CULTURE FOR BACTERIA: CPT | Performed by: EMERGENCY MEDICINE

## 2021-10-24 RX ORDER — CEFDINIR 300 MG/1
300 CAPSULE ORAL 2 TIMES DAILY
Qty: 14 CAPSULE | Refills: 0 | Status: SHIPPED | OUTPATIENT
Start: 2021-10-24 | End: 2021-10-31

## 2021-10-24 NOTE — ED PROVIDER NOTES
Subjective   Patient is a 36-year-old female who presents with a PICC line problem. She unfortunately has a history of drug use including opiates and stimulants. She was admitted to [facility] at the beginning of this month with a Covid pneumonia and what was initially thought to be a MRSA bacteremia. PICC line in place and she received IV antibiotics for over 1 week. She had two additional blood cultures performed after the initial both of which showed no growth. Her respiratory distress slowly improved and she was plan to discontinue her IV antibiotics on the 15th, which would be 14 days after the initial positive blood culture. Unfortunately, the patient left AMA on the 11th. She left AMA with the PICC line still in place. She did not have any outpatient physicians or follow-ups in place. The PICC line is now been in her arm for the past 12 days at home. She states that it recently got caught on something and pulled most of the way out, which is the reason for visit to the emergency department today. She denies medical or systemic complaints. She denies fever, chills, chest pain, shortness of breath, abdominal pain, nausea, vomiting, diarrhea.     She does note she has dysuria and increased frequency and has a history of urinary tract infections. She also admits to recent stimulant use. Her last use was yesterday.          Review of Systems   Constitutional: Negative for chills, diaphoresis, fatigue and fever.   Respiratory: Negative for chest tightness and shortness of breath.    Cardiovascular: Negative for chest pain, palpitations and leg swelling.   Gastrointestinal: Negative for abdominal distention, abdominal pain, diarrhea, nausea and vomiting.   Genitourinary: Positive for dysuria. Negative for decreased urine volume and flank pain.   Musculoskeletal: Negative for back pain.   Neurological: Negative for weakness.   Psychiatric/Behavioral: Negative for agitation and suicidal ideas. The patient  is nervous/anxious.    All other systems reviewed and are negative.      Past Medical History:   Diagnosis Date   • Abnormal drug screen 3/15/2018   • Endocarditis    • Fracture of lumbar vertebra ([facility])     age 14   • Fractures    • History of severe pre-eclampsia    • Hypertension    • IV drug abuse ([facility])    • Kidney infection    • Kidney stone    • Migraine    • Preeclampsia, severe    • Seizures ([facility])     one seizure post partum period preeclampsia   • Urinary tract infection        Allergies   Allergen Reactions   • Benadryl [Diphenhydramine] Shortness Of Breath     Heart racing, muscle cramping   • Vancomycin Shortness Of Breath     Red man syndrome   • Compazine [Prochlorperazine] Other (See Comments)     Restless and agitation   • Reglan [Metoclopramide] Other (See Comments)     agitation   • Toradol [Ketorolac Tromethamine] Other (See Comments)     Heart racing and increased BP   • Triptans Other (See Comments)     restless       Past Surgical History:   Procedure Laterality Date   • BREAST AUGMENTATION Bilateral    •  SECTION      x 2-   ,    • COLONOSCOPY      bleeding, attempt at EGD unsuccessful-polyps found [facility]   • KIDNEY STONE SURGERY     • WISDOM TOOTH EXTRACTION         Family History   Problem Relation Age of Onset   • Arthritis Mother    • Hypertension Mother    • Depression Mother    • Cancer Father         throat   • Diabetes Father    • Hypertension Father    • Heart disease Maternal Grandmother    • Hypertension Maternal Grandfather    • No Known Problems Brother    • Mitochondrial disorder Son    • Neutropenia Son    • Anemia Son         sideroblastic   • Hypertension Maternal Aunt    • Thyroid disease Maternal Aunt    • Migraines Maternal Aunt    • Depression Maternal Aunt    • Arthritis Paternal Grandmother    • Cancer Paternal Grandmother         skin   • Heart disease Paternal Grandmother         stents   • Hyperlipidemia Paternal Grandfather    •  Hypertension Paternal Grandfather    • Diabetes Paternal Grandfather    • Neutropenia Daughter    • Anemia Daughter         sideroblastic   • No Known Problems Brother    • No Known Problems Brother        Social History     Socioeconomic History   • Marital status: Single   Tobacco Use   • Smoking status: Former Smoker   • Smokeless tobacco: Never Used   Substance and Sexual Activity   • Alcohol use: No   • Drug use: Yes     Comment: HEROIN AND METH- LAST USE YESTERDAY    • Sexual activity: Yes     Partners: Male     Birth control/protection: Condom     Comment:            Objective   Physical Exam  Vitals and nursing note reviewed.   Constitutional:       Appearance: She is not toxic-appearing.   HENT:      Head: Normocephalic and atraumatic.      Mouth/Throat:      Mouth: Mucous membranes are moist.   Eyes:      Extraocular Movements: Extraocular movements intact.      Conjunctiva/sclera: Conjunctivae normal.      Pupils: Pupils are equal, round, and reactive to light.   Cardiovascular:      Rate and Rhythm: Regular rhythm. Tachycardia present.      Pulses: Normal pulses.      Heart sounds: Normal heart sounds. No murmur heard.  No friction rub. No gallop.       Comments: No murmurs rubs or gallops appreciated  Pulmonary:      Effort: Pulmonary effort is normal. No respiratory distress.      Breath sounds: Normal breath sounds. No wheezing.   Abdominal:      General: Abdomen is flat. Bowel sounds are normal. There is no distension.      Palpations: Abdomen is soft.      Tenderness: There is no abdominal tenderness. There is no guarding or rebound.   Musculoskeletal:         General: No swelling, tenderness, deformity or signs of injury. Normal range of motion.      Cervical back: Normal range of motion.   Skin:     Capillary Refill: Capillary refill takes 2 to 3 seconds.      Findings: No rash.      Comments: Markings on bilateral upper extremities consistent with scarring and recent IV drug use. Some  these marks could be from recent hospitalization also. She has a PICC line in her left upper arm. The majority of the PICC line is already been withdrawn from the arm with a short segment remaining.   Neurological:      General: No focal deficit present.      Mental Status: She is alert and oriented to person, place, and time. Mental status is at baseline.      Motor: No weakness.      Comments: Restless, consistent with recent stimulant use   Psychiatric:         Thought Content: Thought content normal.      Comments: Anxious. Hyperactive         Procedures           ED Course  ED Course as of 10/24/21 1410   Sun Oct 24, 2021   3999 This unfortunate situation. Patient was initially reluctant to admit to abuse. This was confirmed on a review of the [facility]'s records. Patient then did admit to drug use, most recently yesterday. We had a very long discussion regarding the importance of excepting her drug problem being honest about her drug problem, as I do not believe her general condition will improve until she accepts this and understands that test to make lifestyle changes along with long-term, probably lifetime, therapy. I have recommended the [facility] but will give her a packet of multiple facilities throughout [facility]. Hopefully she will decide to follow this advice. Given her continued denial I am not optimistic of her motivation to quit at this time. Given that the antibiotic was stopped short, even though she does not have any systemic complaints at this time, we will draw blood cultures here in the emergency department. Given her dysuria we will perform a urinalysis and patient will be discharged. I have given her a number for the patient connection line to establish care with a primary care provider. Have also instructed her given her complex medical history that she should have a low threshold to return to the emergency department if symptoms persist or other concerns arise. [CP]      ED Course User Index  [CP]  Carlitos Johnson, DO      I removed pt's PICC line and applied brief direct pressure.  No significant bleeding.  Removed easily and without event.  Pt left AMA prior to UA result.  I called her with the result and send an antibiotic to her desired pharmacy.  I emphasized the importance of seeking assistance for her drug abuse as regardless of her excellent medical care, her condition, in the long term, will not improved without addressing this.  She will have a low threshold to return to the ED if concerns arise.       Recent Results (from the past 24 hour(s))   Urinalysis With Microscopic If Indicated (No Culture) - Urine, Clean Catch    Collection Time: 10/24/21  5:49 AM    Specimen: Urine, Clean Catch   Result Value Ref Range    Color, UA Yellow Yellow, Straw    Appearance, UA Cloudy (A) Clear    pH, UA 6.5 5.0 - 8.0    Specific Gravity, UA 1.020 1.001 - 1.030    Glucose, UA Negative Negative    Ketones, UA Negative Negative    Bilirubin, UA Negative Negative    Blood, UA Negative Negative    Protein, UA Trace (A) Negative    Leuk Esterase, UA Trace (A) Negative    Nitrite, UA Negative Negative    Urobilinogen, UA 1.0 E.U./dL 0.2 - 1.0 E.U./dL   Urinalysis, Microscopic Only - Urine, Clean Catch    Collection Time: 10/24/21  5:49 AM    Specimen: Urine, Clean Catch   Result Value Ref Range    RBC, UA 3-6 (A) None Seen, 0-2 /HPF    WBC, UA 13-20 (A) None Seen, 0-2 /HPF    Bacteria, UA 4+ (A) None Seen, Trace /HPF    Squamous Epithelial Cells, UA 31-50 (A) None Seen, 0-2 /HPF    Hyaline Casts, UA 0-6 0 - 6 /LPF    Methodology Automated Microscopy      Note: In addition to lab results from this visit, the labs listed above may include labs taken at another facility or during a different encounter within the last 24 hours. Please correlate lab times with ED admission and discharge times for further clarification of the services performed during this visit.    No orders to display     Vitals:    10/24/21 0343   BP: (!)  "171/115   BP Location: Right arm   Patient Position: Sitting   Pulse: 112   Resp: 16   Temp: 98.3 °F (36.8 °C)   TempSrc: Oral   SpO2: 99%   Weight: 52.2 kg (115 lb)   Height: 152.4 cm (60\")     Medications - No data to display  ECG/EMG Results (last 24 hours)     ** No results found for the last 24 hours. **        No orders to display                                             MDM    Final diagnoses:   Hypertension, unspecified type   Tachycardia   Displacement of peripherally inserted central venous catheter (PICC) ([facility])   History of recent hospitalization   IV drug abuse ([facility])   Acute UTI       ED Disposition  ED Disposition     ED Disposition Condition Comment    AMA  PT STATES SHE WILL GO HOME AND REQUESTS SOMEONE TO CALL HER IF HER LABS ARE ABNORMAL          [facility]  [address]  [place] [id number]  [phone]  Schedule an appointment as soon as possible for a visit       PATIENT CONNECTION - [place] [id number]  [phone]  Schedule an appointment as soon as possible for a visit       [facility] Emergency Department  [address] [facility] [address] [phone]    If symptoms worsen     Infectious Disease  [facility]  [address] [place] [id number]  Call [phone]  Monday - Friday: 8 a.m. - 5 p.m.  Schedule an appointment as soon as possible for a visit            Medication List      New Prescriptions    cefdinir 300 MG capsule  Commonly known as: OMNICEF  Take 1 capsule by mouth 2 (Two) Times a Day for 7 days.           Where to Get Your Medications      These medications were sent to [id number] - [place], [place] - [address] AT [facility] [place] [address]ELLE MELO - [phone]  - [phone] FX  [address], [facility] [address]    Phone: [phone]   · cefdinir 300 MG capsule          Carlitos Johnson DO  10/24/21 1410       Elizabeth, " DO Carlitos  10/24/21 1419

## 2021-10-29 LAB
BACTERIA SPEC AEROBE CULT: NORMAL
BACTERIA SPEC AEROBE CULT: NORMAL

## 2022-01-31 ENCOUNTER — APPOINTMENT (OUTPATIENT)
Dept: GENERAL RADIOLOGY | Facility: HOSPITAL | Age: 37
End: 2022-01-31

## 2022-01-31 ENCOUNTER — HOSPITAL ENCOUNTER (EMERGENCY)
Facility: HOSPITAL | Age: 37
Discharge: HOME | End: 2022-01-31
Attending: EMERGENCY MEDICINE | Admitting: EMERGENCY MEDICINE

## 2022-01-31 VITALS
HEIGHT: 60 IN | TEMPERATURE: 98 F | HEART RATE: 94 BPM | RESPIRATION RATE: 20 BRPM | DIASTOLIC BLOOD PRESSURE: 106 MMHG | BODY MASS INDEX: 22.58 KG/M2 | SYSTOLIC BLOOD PRESSURE: 157 MMHG | OXYGEN SATURATION: 96 % | WEIGHT: 115 LBS

## 2022-01-31 DIAGNOSIS — S81.802A WOUND OF LEFT LOWER EXTREMITY, INITIAL ENCOUNTER: ICD-10-CM

## 2022-01-31 DIAGNOSIS — N89.8 VAGINAL ODOR: ICD-10-CM

## 2022-01-31 DIAGNOSIS — T19.2XXA FOREIGN BODY IN VAGINA, INITIAL ENCOUNTER: ICD-10-CM

## 2022-01-31 DIAGNOSIS — J06.9 UPPER RESPIRATORY TRACT INFECTION, UNSPECIFIED TYPE: ICD-10-CM

## 2022-01-31 DIAGNOSIS — Z20.822 CLOSE EXPOSURE TO COVID-19 VIRUS: Primary | ICD-10-CM

## 2022-01-31 LAB
B-HCG UR QL: NEGATIVE
BILIRUB UR QL STRIP: NEGATIVE
CLARITY UR: ABNORMAL
CLUE CELLS SPEC QL WET PREP: NORMAL
COLOR UR: YELLOW
EXPIRATION DATE: NORMAL
GLUCOSE UR STRIP-MCNC: NEGATIVE MG/DL
HGB UR QL STRIP.AUTO: NEGATIVE
HYDATID CYST SPEC WET PREP: NORMAL
INTERNAL NEGATIVE CONTROL: NEGATIVE
INTERNAL POSITIVE CONTROL: POSITIVE
KETONES UR QL STRIP: NEGATIVE
KOH PREP NAIL: NORMAL
LEUKOCYTE ESTERASE UR QL STRIP.AUTO: NEGATIVE
Lab: NORMAL
NITRITE UR QL STRIP: NEGATIVE
PH UR STRIP.AUTO: 6.5 [PH] (ref 5–8)
PROT UR QL STRIP: NEGATIVE
SARS-COV-2 RNA PNL SPEC NAA+PROBE: NOT DETECTED
SP GR UR STRIP: 1.02 (ref 1–1.03)
T VAGINALIS SPEC QL WET PREP: NORMAL
UROBILINOGEN UR QL STRIP: ABNORMAL
WBC SPEC QL WET PREP: NORMAL
YEAST GENITAL QL WET PREP: NORMAL

## 2022-01-31 PROCEDURE — 87591 N.GONORRHOEAE DNA AMP PROB: CPT | Performed by: PHYSICIAN ASSISTANT

## 2022-01-31 PROCEDURE — 81025 URINE PREGNANCY TEST: CPT | Performed by: EMERGENCY MEDICINE

## 2022-01-31 PROCEDURE — 72170 X-RAY EXAM OF PELVIS: CPT

## 2022-01-31 PROCEDURE — 87491 CHLMYD TRACH DNA AMP PROBE: CPT | Performed by: PHYSICIAN ASSISTANT

## 2022-01-31 PROCEDURE — 81003 URINALYSIS AUTO W/O SCOPE: CPT | Performed by: EMERGENCY MEDICINE

## 2022-01-31 PROCEDURE — U0004 COV-19 TEST NON-CDC HGH THRU: HCPCS | Performed by: EMERGENCY MEDICINE

## 2022-01-31 PROCEDURE — 25010000002 CEFTRIAXONE PER 250 MG: Performed by: PHYSICIAN ASSISTANT

## 2022-01-31 PROCEDURE — C9803 HOPD COVID-19 SPEC COLLECT: HCPCS

## 2022-01-31 PROCEDURE — 87210 SMEAR WET MOUNT SALINE/INK: CPT | Performed by: PHYSICIAN ASSISTANT

## 2022-01-31 PROCEDURE — 99284 EMERGENCY DEPT VISIT MOD MDM: CPT

## 2022-01-31 PROCEDURE — 87220 TISSUE EXAM FOR FUNGI: CPT | Performed by: PHYSICIAN ASSISTANT

## 2022-01-31 PROCEDURE — 96372 THER/PROPH/DIAG INJ SC/IM: CPT

## 2022-01-31 RX ORDER — AZITHROMYCIN 250 MG/1
1000 TABLET, FILM COATED ORAL ONCE
Status: COMPLETED | OUTPATIENT
Start: 2022-01-31 | End: 2022-01-31

## 2022-01-31 RX ORDER — SULFAMETHOXAZOLE AND TRIMETHOPRIM 800; 160 MG/1; MG/1
1 TABLET ORAL 2 TIMES DAILY
Qty: 20 TABLET | Refills: 0 | Status: SHIPPED | OUTPATIENT
Start: 2022-01-31 | End: 2022-02-10

## 2022-01-31 RX ORDER — CEPHALEXIN 500 MG/1
500 CAPSULE ORAL 2 TIMES DAILY
Qty: 20 CAPSULE | Refills: 0 | Status: SHIPPED | OUTPATIENT
Start: 2022-01-31 | End: 2022-02-10

## 2022-01-31 RX ADMIN — AZITHROMYCIN MONOHYDRATE 1000 MG: 250 TABLET ORAL at 20:28

## 2022-01-31 RX ADMIN — LIDOCAINE HYDROCHLORIDE 250 MG: 10 INJECTION, SOLUTION EPIDURAL; INFILTRATION; INTRACAUDAL; PERINEURAL at 20:28

## 2022-02-02 LAB
C TRACH RRNA SPEC QL NAA+PROBE: NEGATIVE
N GONORRHOEA RRNA SPEC QL NAA+PROBE: NEGATIVE

## 2022-12-02 ENCOUNTER — HOSPITAL ENCOUNTER (INPATIENT)
Facility: HOSPITAL | Age: 37
LOS: 3 days | Discharge: HOME | End: 2022-12-05
Attending: EMERGENCY MEDICINE | Admitting: INTERNAL MEDICINE

## 2022-12-02 ENCOUNTER — APPOINTMENT (OUTPATIENT)
Dept: CARDIOLOGY | Facility: HOSPITAL | Age: 37
End: 2022-12-02

## 2022-12-02 ENCOUNTER — APPOINTMENT (OUTPATIENT)
Dept: GENERAL RADIOLOGY | Facility: HOSPITAL | Age: 37
End: 2022-12-02

## 2022-12-02 ENCOUNTER — APPOINTMENT (OUTPATIENT)
Dept: CT IMAGING | Facility: HOSPITAL | Age: 37
End: 2022-12-02

## 2022-12-02 DIAGNOSIS — L02.415 ABSCESS OF RIGHT LEG: ICD-10-CM

## 2022-12-02 DIAGNOSIS — L03.116 BILATERAL LOWER LEG CELLULITIS: ICD-10-CM

## 2022-12-02 DIAGNOSIS — L03.115 BILATERAL LOWER LEG CELLULITIS: ICD-10-CM

## 2022-12-02 DIAGNOSIS — Z86.79 HISTORY OF ENDOCARDITIS: Primary | ICD-10-CM

## 2022-12-02 DIAGNOSIS — I27.20 PULMONARY HYPERTENSION: ICD-10-CM

## 2022-12-02 PROBLEM — F19.90 DRUG USE: Status: ACTIVE | Noted: 2022-12-02

## 2022-12-02 PROBLEM — L03.119 CELLULITIS OF EXTREMITY: Status: ACTIVE | Noted: 2022-12-02

## 2022-12-02 PROBLEM — Z86.718 HISTORY OF DVT (DEEP VEIN THROMBOSIS): Status: ACTIVE | Noted: 2022-12-02

## 2022-12-02 PROBLEM — R30.0 DYSURIA: Status: ACTIVE | Noted: 2022-12-02

## 2022-12-02 LAB
ALBUMIN SERPL-MCNC: 3.7 G/DL (ref 3.5–5.2)
ALBUMIN/GLOB SERPL: 0.9 G/DL
ALP SERPL-CCNC: 52 U/L (ref 39–117)
ALT SERPL W P-5'-P-CCNC: 44 U/L (ref 1–33)
AMPHET+METHAMPHET UR QL: NEGATIVE
AMPHETAMINES UR QL: NEGATIVE
ANION GAP SERPL CALCULATED.3IONS-SCNC: 10 MMOL/L (ref 5–15)
AST SERPL-CCNC: 54 U/L (ref 1–32)
B-HCG UR QL: NEGATIVE
BACTERIA UR QL AUTO: ABNORMAL /HPF
BARBITURATES UR QL SCN: NEGATIVE
BASOPHILS # BLD AUTO: 0.03 10*3/MM3 (ref 0–0.2)
BASOPHILS NFR BLD AUTO: 0.6 % (ref 0–1.5)
BENZODIAZ UR QL SCN: NEGATIVE
BH CV LOWER VASCULAR LEFT COMMON FEMORAL AUGMENT: NORMAL
BH CV LOWER VASCULAR LEFT COMMON FEMORAL COMPETENT: NORMAL
BH CV LOWER VASCULAR LEFT COMMON FEMORAL COMPRESS: NORMAL
BH CV LOWER VASCULAR LEFT COMMON FEMORAL PHASIC: NORMAL
BH CV LOWER VASCULAR LEFT COMMON FEMORAL SPONT: NORMAL
BH CV LOWER VASCULAR LEFT DISTAL FEMORAL COMPRESS: NORMAL
BH CV LOWER VASCULAR LEFT GASTRONEMIUS COMPRESS: NORMAL
BH CV LOWER VASCULAR LEFT GREATER SAPH AK COMPRESS: NORMAL
BH CV LOWER VASCULAR LEFT GREATER SAPH BK COMPRESS: NORMAL
BH CV LOWER VASCULAR LEFT LESSER SAPH COMPRESS: NORMAL
BH CV LOWER VASCULAR LEFT MID FEMORAL AUGMENT: NORMAL
BH CV LOWER VASCULAR LEFT MID FEMORAL COMPETENT: NORMAL
BH CV LOWER VASCULAR LEFT MID FEMORAL COMPRESS: NORMAL
BH CV LOWER VASCULAR LEFT MID FEMORAL PHASIC: NORMAL
BH CV LOWER VASCULAR LEFT MID FEMORAL SPONT: NORMAL
BH CV LOWER VASCULAR LEFT PERONEAL COMPRESS: NORMAL
BH CV LOWER VASCULAR LEFT POPLITEAL AUGMENT: NORMAL
BH CV LOWER VASCULAR LEFT POPLITEAL COMPETENT: NORMAL
BH CV LOWER VASCULAR LEFT POPLITEAL COMPRESS: NORMAL
BH CV LOWER VASCULAR LEFT POPLITEAL PHASIC: NORMAL
BH CV LOWER VASCULAR LEFT POPLITEAL SPONT: NORMAL
BH CV LOWER VASCULAR LEFT POSTERIOR TIBIAL COMPRESS: NORMAL
BH CV LOWER VASCULAR LEFT PROFUNDA FEMORAL COMPRESS: NORMAL
BH CV LOWER VASCULAR LEFT PROXIMAL FEMORAL COMPRESS: NORMAL
BH CV LOWER VASCULAR LEFT SAPHENOFEMORAL JUNCTION COMPRESS: NORMAL
BH CV LOWER VASCULAR RIGHT COMMON FEMORAL AUGMENT: NORMAL
BH CV LOWER VASCULAR RIGHT COMMON FEMORAL COMPETENT: NORMAL
BH CV LOWER VASCULAR RIGHT COMMON FEMORAL COMPRESS: NORMAL
BH CV LOWER VASCULAR RIGHT COMMON FEMORAL PHASIC: NORMAL
BH CV LOWER VASCULAR RIGHT COMMON FEMORAL SPONT: NORMAL
BH CV LOWER VASCULAR RIGHT DISTAL FEMORAL COMPRESS: NORMAL
BH CV LOWER VASCULAR RIGHT GASTRONEMIUS COMPRESS: NORMAL
BH CV LOWER VASCULAR RIGHT GREATER SAPH AK COMPRESS: NORMAL
BH CV LOWER VASCULAR RIGHT GREATER SAPH BK COMPRESS: NORMAL
BH CV LOWER VASCULAR RIGHT LESSER SAPH COMPRESS: NORMAL
BH CV LOWER VASCULAR RIGHT MID FEMORAL AUGMENT: NORMAL
BH CV LOWER VASCULAR RIGHT MID FEMORAL COMPETENT: NORMAL
BH CV LOWER VASCULAR RIGHT MID FEMORAL COMPRESS: NORMAL
BH CV LOWER VASCULAR RIGHT MID FEMORAL PHASIC: NORMAL
BH CV LOWER VASCULAR RIGHT MID FEMORAL SPONT: NORMAL
BH CV LOWER VASCULAR RIGHT PERONEAL COMPRESS: NORMAL
BH CV LOWER VASCULAR RIGHT POPLITEAL AUGMENT: NORMAL
BH CV LOWER VASCULAR RIGHT POPLITEAL COMPETENT: NORMAL
BH CV LOWER VASCULAR RIGHT POPLITEAL COMPRESS: NORMAL
BH CV LOWER VASCULAR RIGHT POPLITEAL PHASIC: NORMAL
BH CV LOWER VASCULAR RIGHT POPLITEAL SPONT: NORMAL
BH CV LOWER VASCULAR RIGHT POSTERIOR TIBIAL COMPRESS: NORMAL
BH CV LOWER VASCULAR RIGHT PROFUNDA FEMORAL COMPRESS: NORMAL
BH CV LOWER VASCULAR RIGHT PROXIMAL FEMORAL COMPRESS: NORMAL
BH CV LOWER VASCULAR RIGHT SAPHENOFEMORAL JUNCTION COMPRESS: NORMAL
BH CV VAS PRELIMINARY FINDINGS SCRIPTING: 1
BILIRUB SERPL-MCNC: 0.5 MG/DL (ref 0–1.2)
BILIRUB UR QL STRIP: NEGATIVE
BUN SERPL-MCNC: 10 MG/DL (ref 6–20)
BUN/CREAT SERPL: 13.5 (ref 7–25)
BUPRENORPHINE SERPL-MCNC: NEGATIVE NG/ML
CALCIUM SPEC-SCNC: 9.1 MG/DL (ref 8.6–10.5)
CANNABINOIDS SERPL QL: NEGATIVE
CHLORIDE SERPL-SCNC: 104 MMOL/L (ref 98–107)
CLARITY UR: ABNORMAL
CO2 SERPL-SCNC: 26 MMOL/L (ref 22–29)
COCAINE UR QL: POSITIVE
COLOR UR: YELLOW
CREAT SERPL-MCNC: 0.74 MG/DL (ref 0.57–1)
D-LACTATE SERPL-SCNC: 0.7 MMOL/L (ref 0.5–2)
DEPRECATED RDW RBC AUTO: 53.5 FL (ref 37–54)
EGFRCR SERPLBLD CKD-EPI 2021: 105.7 ML/MIN/1.73
EOSINOPHIL # BLD AUTO: 0.16 10*3/MM3 (ref 0–0.4)
EOSINOPHIL NFR BLD AUTO: 3.1 % (ref 0.3–6.2)
ERYTHROCYTE [DISTWIDTH] IN BLOOD BY AUTOMATED COUNT: 14.9 % (ref 12.3–15.4)
FLUAV SUBTYP SPEC NAA+PROBE: NOT DETECTED
FLUBV RNA ISLT QL NAA+PROBE: NOT DETECTED
GLOBULIN UR ELPH-MCNC: 4.3 GM/DL
GLUCOSE SERPL-MCNC: 87 MG/DL (ref 65–99)
GLUCOSE UR STRIP-MCNC: NEGATIVE MG/DL
HCT VFR BLD AUTO: 39.7 % (ref 34–46.6)
HGB BLD-MCNC: 12.5 G/DL (ref 12–15.9)
HGB UR QL STRIP.AUTO: NEGATIVE
HOLD SPECIMEN: NORMAL
HYALINE CASTS UR QL AUTO: ABNORMAL /LPF
IMM GRANULOCYTES # BLD AUTO: 0.01 10*3/MM3 (ref 0–0.05)
IMM GRANULOCYTES NFR BLD AUTO: 0.2 % (ref 0–0.5)
KETONES UR QL STRIP: NEGATIVE
LEUKOCYTE ESTERASE UR QL STRIP.AUTO: NEGATIVE
LYMPHOCYTES # BLD AUTO: 1.15 10*3/MM3 (ref 0.7–3.1)
LYMPHOCYTES NFR BLD AUTO: 22 % (ref 19.6–45.3)
MAXIMAL PREDICTED HEART RATE: 181 BPM
MCH RBC QN AUTO: 30.6 PG (ref 26.6–33)
MCHC RBC AUTO-ENTMCNC: 31.5 G/DL (ref 31.5–35.7)
MCV RBC AUTO: 97.1 FL (ref 79–97)
METHADONE UR QL SCN: NEGATIVE
MONOCYTES # BLD AUTO: 0.45 10*3/MM3 (ref 0.1–0.9)
MONOCYTES NFR BLD AUTO: 8.6 % (ref 5–12)
NEUTROPHILS NFR BLD AUTO: 3.42 10*3/MM3 (ref 1.7–7)
NEUTROPHILS NFR BLD AUTO: 65.5 % (ref 42.7–76)
NITRITE UR QL STRIP: NEGATIVE
NRBC BLD AUTO-RTO: 0 /100 WBC (ref 0–0.2)
NT-PROBNP SERPL-MCNC: 387 PG/ML (ref 0–450)
OPIATES UR QL: NEGATIVE
OXYCODONE UR QL SCN: NEGATIVE
PCP UR QL SCN: NEGATIVE
PH UR STRIP.AUTO: 7.5 [PH] (ref 5–8)
PLATELET # BLD AUTO: 283 10*3/MM3 (ref 140–450)
PMV BLD AUTO: 8.7 FL (ref 6–12)
POTASSIUM SERPL-SCNC: 3.6 MMOL/L (ref 3.5–5.2)
PROCALCITONIN SERPL-MCNC: 0.61 NG/ML (ref 0–0.25)
PROPOXYPH UR QL: NEGATIVE
PROT SERPL-MCNC: 8 G/DL (ref 6–8.5)
PROT UR QL STRIP: NEGATIVE
QT INTERVAL: 412 MS
QTC INTERVAL: 475 MS
RBC # BLD AUTO: 4.09 10*6/MM3 (ref 3.77–5.28)
RBC # UR STRIP: ABNORMAL /HPF
REF LAB TEST METHOD: ABNORMAL
SARS-COV-2 RNA PNL SPEC NAA+PROBE: NOT DETECTED
SODIUM SERPL-SCNC: 140 MMOL/L (ref 136–145)
SP GR UR STRIP: 1.02 (ref 1–1.03)
SQUAMOUS #/AREA URNS HPF: ABNORMAL /HPF
STRESS TARGET HR: 154 BPM
TRICYCLICS UR QL SCN: NEGATIVE
TROPONIN T SERPL-MCNC: <0.01 NG/ML (ref 0–0.03)
UROBILINOGEN UR QL STRIP: ABNORMAL
WBC # UR STRIP: ABNORMAL /HPF
WBC NRBC COR # BLD: 5.22 10*3/MM3 (ref 3.4–10.8)
WHOLE BLOOD HOLD COAG: NORMAL
WHOLE BLOOD HOLD SPECIMEN: NORMAL

## 2022-12-02 PROCEDURE — 71045 X-RAY EXAM CHEST 1 VIEW: CPT

## 2022-12-02 PROCEDURE — 99223 1ST HOSP IP/OBS HIGH 75: CPT | Performed by: INTERNAL MEDICINE

## 2022-12-02 PROCEDURE — 25010000002 CEFTRIAXONE PER 250 MG: Performed by: NURSE PRACTITIONER

## 2022-12-02 PROCEDURE — 87186 SC STD MICRODIL/AGAR DIL: CPT | Performed by: NURSE PRACTITIONER

## 2022-12-02 PROCEDURE — 87636 SARSCOV2 & INF A&B AMP PRB: CPT | Performed by: NURSE PRACTITIONER

## 2022-12-02 PROCEDURE — 80053 COMPREHEN METABOLIC PANEL: CPT | Performed by: EMERGENCY MEDICINE

## 2022-12-02 PROCEDURE — 83880 ASSAY OF NATRIURETIC PEPTIDE: CPT | Performed by: NURSE PRACTITIONER

## 2022-12-02 PROCEDURE — 87077 CULTURE AEROBIC IDENTIFY: CPT | Performed by: NURSE PRACTITIONER

## 2022-12-02 PROCEDURE — 82550 ASSAY OF CK (CPK): CPT

## 2022-12-02 PROCEDURE — 84484 ASSAY OF TROPONIN QUANT: CPT | Performed by: NURSE PRACTITIONER

## 2022-12-02 PROCEDURE — 87491 CHLMYD TRACH DNA AMP PROBE: CPT | Performed by: NURSE PRACTITIONER

## 2022-12-02 PROCEDURE — 87591 N.GONORRHOEAE DNA AMP PROB: CPT | Performed by: NURSE PRACTITIONER

## 2022-12-02 PROCEDURE — 87086 URINE CULTURE/COLONY COUNT: CPT | Performed by: NURSE PRACTITIONER

## 2022-12-02 PROCEDURE — 81025 URINE PREGNANCY TEST: CPT | Performed by: EMERGENCY MEDICINE

## 2022-12-02 PROCEDURE — 25010000002 SODIUM CHLORIDE 0.9 % WITH KCL 20 MEQ 20-0.9 MEQ/L-% SOLUTION: Performed by: NURSE PRACTITIONER

## 2022-12-02 PROCEDURE — 87661 TRICHOMONAS VAGINALIS AMPLIF: CPT | Performed by: NURSE PRACTITIONER

## 2022-12-02 PROCEDURE — 25010000002 DAPTOMYCIN PER 1 MG: Performed by: NURSE PRACTITIONER

## 2022-12-02 PROCEDURE — 81001 URINALYSIS AUTO W/SCOPE: CPT | Performed by: NURSE PRACTITIONER

## 2022-12-02 PROCEDURE — 87205 SMEAR GRAM STAIN: CPT | Performed by: NURSE PRACTITIONER

## 2022-12-02 PROCEDURE — 99284 EMERGENCY DEPT VISIT MOD MDM: CPT

## 2022-12-02 PROCEDURE — 86140 C-REACTIVE PROTEIN: CPT | Performed by: INTERNAL MEDICINE

## 2022-12-02 PROCEDURE — 87070 CULTURE OTHR SPECIMN AEROBIC: CPT | Performed by: NURSE PRACTITIONER

## 2022-12-02 PROCEDURE — 93970 EXTREMITY STUDY: CPT | Performed by: INTERNAL MEDICINE

## 2022-12-02 PROCEDURE — 80306 DRUG TEST PRSMV INSTRMNT: CPT | Performed by: NURSE PRACTITIONER

## 2022-12-02 PROCEDURE — 85025 COMPLETE CBC W/AUTO DIFF WBC: CPT | Performed by: EMERGENCY MEDICINE

## 2022-12-02 PROCEDURE — 84145 PROCALCITONIN (PCT): CPT | Performed by: NURSE PRACTITIONER

## 2022-12-02 PROCEDURE — 93005 ELECTROCARDIOGRAM TRACING: CPT | Performed by: NURSE PRACTITIONER

## 2022-12-02 PROCEDURE — 74176 CT ABD & PELVIS W/O CONTRAST: CPT

## 2022-12-02 PROCEDURE — 83605 ASSAY OF LACTIC ACID: CPT | Performed by: EMERGENCY MEDICINE

## 2022-12-02 PROCEDURE — 0Y9H0ZZ DRAINAGE OF RIGHT LOWER LEG, OPEN APPROACH: ICD-10-PCS | Performed by: NURSE PRACTITIONER

## 2022-12-02 PROCEDURE — 36415 COLL VENOUS BLD VENIPUNCTURE: CPT

## 2022-12-02 PROCEDURE — 93970 EXTREMITY STUDY: CPT

## 2022-12-02 RX ORDER — SODIUM CHLORIDE 0.9 % (FLUSH) 0.9 %
10 SYRINGE (ML) INJECTION AS NEEDED
Status: DISCONTINUED | OUTPATIENT
Start: 2022-12-02 | End: 2022-12-05 | Stop reason: HOSPADM

## 2022-12-02 RX ORDER — LABETALOL HYDROCHLORIDE 5 MG/ML
10 INJECTION, SOLUTION INTRAVENOUS EVERY 6 HOURS PRN
Status: DISCONTINUED | OUTPATIENT
Start: 2022-12-02 | End: 2022-12-05 | Stop reason: HOSPADM

## 2022-12-02 RX ORDER — TEMAZEPAM 15 MG/1
15 CAPSULE ORAL NIGHTLY PRN
Status: DISCONTINUED | OUTPATIENT
Start: 2022-12-02 | End: 2022-12-05 | Stop reason: HOSPADM

## 2022-12-02 RX ORDER — SODIUM CHLORIDE 9 MG/ML
40 INJECTION, SOLUTION INTRAVENOUS AS NEEDED
Status: DISCONTINUED | OUTPATIENT
Start: 2022-12-02 | End: 2022-12-05 | Stop reason: HOSPADM

## 2022-12-02 RX ORDER — ACETAMINOPHEN 325 MG/1
650 TABLET ORAL EVERY 4 HOURS PRN
Status: DISCONTINUED | OUTPATIENT
Start: 2022-12-02 | End: 2022-12-05 | Stop reason: HOSPADM

## 2022-12-02 RX ORDER — AMOXICILLIN 250 MG
2 CAPSULE ORAL 2 TIMES DAILY
Status: DISCONTINUED | OUTPATIENT
Start: 2022-12-02 | End: 2022-12-05 | Stop reason: HOSPADM

## 2022-12-02 RX ORDER — L.ACID,PARA/B.BIFIDUM/S.THERM 8B CELL
1 CAPSULE ORAL DAILY
Status: DISCONTINUED | OUTPATIENT
Start: 2022-12-02 | End: 2022-12-05 | Stop reason: HOSPADM

## 2022-12-02 RX ORDER — BISACODYL 5 MG/1
5 TABLET, DELAYED RELEASE ORAL DAILY PRN
Status: DISCONTINUED | OUTPATIENT
Start: 2022-12-02 | End: 2022-12-05 | Stop reason: HOSPADM

## 2022-12-02 RX ORDER — ALUMINA, MAGNESIA, AND SIMETHICONE 2400; 2400; 240 MG/30ML; MG/30ML; MG/30ML
15 SUSPENSION ORAL EVERY 6 HOURS PRN
Status: DISCONTINUED | OUTPATIENT
Start: 2022-12-02 | End: 2022-12-05 | Stop reason: HOSPADM

## 2022-12-02 RX ORDER — LIDOCAINE HYDROCHLORIDE AND EPINEPHRINE 10; 10 MG/ML; UG/ML
10 INJECTION, SOLUTION INFILTRATION; PERINEURAL ONCE
Status: DISCONTINUED | OUTPATIENT
Start: 2022-12-02 | End: 2022-12-02

## 2022-12-02 RX ORDER — ENOXAPARIN SODIUM 100 MG/ML
40 INJECTION SUBCUTANEOUS DAILY
Status: DISCONTINUED | OUTPATIENT
Start: 2022-12-03 | End: 2022-12-05 | Stop reason: HOSPADM

## 2022-12-02 RX ORDER — SODIUM CHLORIDE 0.9 % (FLUSH) 0.9 %
10 SYRINGE (ML) INJECTION EVERY 12 HOURS SCHEDULED
Status: DISCONTINUED | OUTPATIENT
Start: 2022-12-02 | End: 2022-12-05 | Stop reason: HOSPADM

## 2022-12-02 RX ORDER — POLYETHYLENE GLYCOL 3350 17 G/17G
17 POWDER, FOR SOLUTION ORAL DAILY PRN
Status: DISCONTINUED | OUTPATIENT
Start: 2022-12-02 | End: 2022-12-05 | Stop reason: HOSPADM

## 2022-12-02 RX ORDER — BISACODYL 10 MG
10 SUPPOSITORY, RECTAL RECTAL DAILY PRN
Status: DISCONTINUED | OUTPATIENT
Start: 2022-12-02 | End: 2022-12-05 | Stop reason: HOSPADM

## 2022-12-02 RX ORDER — SODIUM CHLORIDE AND POTASSIUM CHLORIDE 150; 900 MG/100ML; MG/100ML
100 INJECTION, SOLUTION INTRAVENOUS CONTINUOUS
Status: DISCONTINUED | OUTPATIENT
Start: 2022-12-02 | End: 2022-12-03

## 2022-12-02 RX ORDER — ONDANSETRON 2 MG/ML
4 INJECTION INTRAMUSCULAR; INTRAVENOUS EVERY 6 HOURS PRN
Status: DISCONTINUED | OUTPATIENT
Start: 2022-12-02 | End: 2022-12-05 | Stop reason: HOSPADM

## 2022-12-02 RX ADMIN — METOPROLOL TARTRATE 25 MG: 25 TABLET, FILM COATED ORAL at 19:53

## 2022-12-02 RX ADMIN — Medication 10 ML: at 19:55

## 2022-12-02 RX ADMIN — DAPTOMYCIN 400 MG: 500 INJECTION, POWDER, LYOPHILIZED, FOR SOLUTION INTRAVENOUS at 15:48

## 2022-12-02 RX ADMIN — Medication 1 CAPSULE: at 19:54

## 2022-12-02 RX ADMIN — Medication 3 ML: at 14:21

## 2022-12-02 RX ADMIN — ACETAMINOPHEN 650 MG: 325 TABLET ORAL at 19:53

## 2022-12-02 RX ADMIN — POTASSIUM CHLORIDE AND SODIUM CHLORIDE 100 ML/HR: 900; 150 INJECTION, SOLUTION INTRAVENOUS at 22:36

## 2022-12-02 RX ADMIN — SODIUM CHLORIDE 1 G: 900 INJECTION INTRAVENOUS at 19:21

## 2022-12-02 NOTE — ED PROVIDER NOTES
Subjective   History of Present Illness  Patient presents the ER for worsening bilateral lower extremity edema and redness.  She tells me she has had a history of endocarditis several years ago.  She has not had any IV drugs in over 6 months since being incarcerated which she got out of nursing home last month.  She does admit recently using cocaine but tells me she did not use IV drugs.  She does report a small abscess to her right lateral thigh.  She tells me she has had a history of DVTs in the past and has not been compliant with her blood thinners.  She tells me she was at [facility] recently but advised me they did nothing.  Upon review of those records it shows that she did have an ultrasound bilaterally with reading by the radiologist however the patient adamantly denies ever having any sort of ultrasound there.  She denies any chest pain or difficulty breathing.    Leg Swelling  Location:  Bilateral  Severity:  Moderate  Onset quality:  Gradual  Duration:  1 week  Timing:  Constant  Progression:  Worsening  Relieved by:  Rest  Worsened by:  Walking  Associated symptoms: no abdominal pain, no chest pain, no congestion, no cough, no diarrhea, no fever, no nausea, no shortness of breath, no sore throat, no vomiting and no wheezing        Review of Systems   Constitutional: Negative for chills, diaphoresis and fever.   HENT: Negative for congestion and sore throat.    Respiratory: Negative for cough, choking, chest tightness, shortness of breath and wheezing.    Cardiovascular: Negative for chest pain and leg swelling.   Gastrointestinal: Negative for abdominal distention, abdominal pain, anal bleeding, blood in stool, constipation, diarrhea, nausea and vomiting.   Genitourinary: Negative for difficulty urinating, dysuria, flank pain, frequency, hematuria and urgency.   All other systems reviewed and are negative.      Past Medical History:   Diagnosis Date   • Abnormal drug screen 3/15/2018   • Endocarditis    •  Fracture of lumbar vertebra ([facility])     age 14   • Fractures    • History of severe pre-eclampsia    • Hypertension    • IV drug abuse ([facility])    • Kidney infection    • Kidney stone    • Migraine    • Preeclampsia, severe    • Seizures ([facility]) 2007    one seizure post partum period preeclampsia   • Urinary tract infection        Allergies   Allergen Reactions   • Benadryl [Diphenhydramine] Shortness Of Breath     Heart racing, muscle cramping   • Vancomycin Shortness Of Breath     Red man syndrome   • Compazine [Prochlorperazine] Other (See Comments)     Restless and agitation   • Reglan [Metoclopramide] Other (See Comments)     agitation   • Toradol [Ketorolac Tromethamine] Other (See Comments)     Heart racing and increased BP   • Triptans Other (See Comments)     restless       Past Surgical History:   Procedure Laterality Date   • BREAST AUGMENTATION Bilateral 2009   •  SECTION      x 2-   ,    • COLONOSCOPY  2005    bleeding, attempt at EGD unsuccessful-polyps found [facility]   • KIDNEY STONE SURGERY     • WISDOM TOOTH EXTRACTION         Family History   Problem Relation Age of Onset   • Arthritis Mother    • Hypertension Mother    • Depression Mother    • Cancer Father         throat   • Diabetes Father    • Hypertension Father    • Heart disease Maternal Grandmother    • Hypertension Maternal Grandfather    • No Known Problems Brother    • Mitochondrial disorder Son    • Neutropenia Son    • Anemia Son         sideroblastic   • Hypertension Maternal Aunt    • Thyroid disease Maternal Aunt    • Migraines Maternal Aunt    • Depression Maternal Aunt    • Arthritis Paternal Grandmother    • Cancer Paternal Grandmother         skin   • Heart disease Paternal Grandmother         stents   • Hyperlipidemia Paternal Grandfather    • Hypertension Paternal Grandfather    • Diabetes Paternal Grandfather    • Neutropenia Daughter    • Anemia Daughter         sideroblastic   • No Known Problems Brother    • No  Known Problems Brother        Social History     Socioeconomic History   • Marital status: Single   Tobacco Use   • Smoking status: Former   • Smokeless tobacco: Never   Substance and Sexual Activity   • Alcohol use: No   • Drug use: Yes     Comment: INTERMITTENT IVDU AND BENZOS/PILLS   • Sexual activity: Yes     Partners: Male     Birth control/protection: Condom     Comment:            Objective   Physical Exam  Constitutional:       Appearance: She is well-developed.   HENT:      Head: Normocephalic and atraumatic.      Right Ear: External ear normal.      Left Ear: External ear normal.      Nose: Nose normal.   Eyes:      Conjunctiva/sclera: Conjunctivae normal.      Pupils: Pupils are equal, round, and reactive to light.   Cardiovascular:      Rate and Rhythm: Normal rate and regular rhythm.      Heart sounds: Normal heart sounds.   Pulmonary:      Effort: Pulmonary effort is normal.      Breath sounds: Normal breath sounds.   Abdominal:      General: Bowel sounds are normal.      Palpations: Abdomen is soft.   Musculoskeletal:         General: Swelling and tenderness present. Normal range of motion.      Cervical back: Normal range of motion and neck supple.      Right lower leg: Edema present.      Left lower leg: Edema present.      Comments: Small tender area to right lateral upper thigh consistent with small abscess.  We will need to numb this area and attempt drainage.  She has other areas of redness throughout both legs consistent with cellulitis.   Skin:     General: Skin is warm and dry.   Neurological:      Mental Status: She is alert and oriented to person, place, and time.   Psychiatric:         Behavior: Behavior normal.         Thought Content: Thought content normal.         Judgment: Judgment normal.         Incision & Drainage    Date/Time: 12/2/2022 3:51 PM  Performed by: Jacinto Tierney APRN  Authorized by: Jcarlos Kerr MD     Consent:     Consent obtained:  Verbal    Consent  given by:  Patient    Risks discussed:  Bleeding, damage to other organs and incomplete drainage  Universal protocol:     Patient identity confirmed:  Verbally with patient  Location:     Type:  Abscess    Size:  2cm  Pre-procedure details:     Skin preparation:  Povidone-iodine  Anesthesia:     Anesthesia method:  Topical application and local infiltration    Local anesthetic:  Lidocaine 1% WITH epi  Procedure type:     Complexity:  Complex  Procedure details:     Needle aspiration: yes      Needle size:  18 G    Incision types:  Stab incision    Incision depth:  Dermal    Wound management:  Probed and deloculated, irrigated with saline and extensive cleaning    Drainage:  Bloody and purulent    Drainage amount:  Moderate    Wound treatment:  Wound left open    Packing materials:  None  Post-procedure details:     Procedure completion:  Tolerated               ED Course  ED Course as of 12/02/22 1552   Fri Dec 02, 2022   1429 Doppler secure chatted me and advised me that the pulmonary is negative for DVT. [JM]      ED Course User Index  [JM] Jacinto Tierney, CAM                 XR Chest 1 View   Final Result   IMPRESSION :    Linear opacities at the lung bases compatible scarring or atelectasis.       Otherwise unremarkable exam[       This report was finalized on 12/2/2022 10:51 AM by Loinel Joshua.                                         MDM    Final diagnoses:   History of endocarditis   Bilateral lower leg cellulitis   Abscess of right leg       ED Disposition  ED Disposition     ED Disposition   Decision to Admit    Condition   --    Comment   Level of Care: Telemetry [5]   Diagnosis: History of endocarditis [624001]   Admitting Physician: JAVIER GARCÍA [[id number]]   Certification: I Certify That Inpatient Hospital Services Are Medically Necessary For Greater Than 2 Midnights               No follow-up provider specified.       Medication List      No changes were made to your prescriptions during this visit.           Jacinto Tierney, CAM  12/02/22 1515       Jacinto Tierney APRN  12/02/22 1551

## 2022-12-02 NOTE — CASE MANAGEMENT/SOCIAL WORK
Discharge Planning Assessment  [facility]     Patient Name: Marcia Goel  MRN: [id number]  Today's Date: 12/2/2022    Admit Date: 12/2/2022    Plan: IDP   Discharge Needs Assessment     Row Name 12/02/22 174       Living Environment    People in Home significant other    Name(s) of People in Home Rachid Carr    Current Living Arrangements home    Potentially Unsafe Housing Conditions unable to assess    Primary Care Provided by self    Provides Primary Care For no one    Family Caregiver if Needed significant other    Quality of Family Relationships unable to assess       Resource/Environmental Concerns    Transportation Concerns none       Transition Planning    Patient/Family Anticipates Transition to home with family    Transportation Anticipated family or friend will provide       Discharge Needs Assessment    Readmission Within the Last 30 Days no previous admission in last 30 days    Equipment Currently Used at Home none    Equipment Needed After Discharge other (see comments)  TBD    Discharge Facility/Level of Care Needs other (see comments)  TBD               Discharge Plan     Row Name 12/02/22 1741       Plan    Plan IDP    Plan Comments MSW met with pt. and her significant other Rachid Carr at bedside. Pt. lives with S.O. in [facility]. Pt. does not have a PCP and would like to have a [facility] provider arranged. Pt.’s pharmacy is "[facility]. Pt.’s insurance is [employer]. Pt. denies DME, O2, or HH services. Pt. reports she has transportation back home when she is medically ready to d/c. Pt. does not have an advanced directive or HCS documentation on file. CM will continue to follow pt. throughout her stay.    Final Discharge Disposition Code 30 - still a patient              Continued Care and Services - Admitted Since 12/2/2022    Coordination has not been started for this encounter.          Demographic Summary     Row Name 12/02/22 1742       General Information    Admission  Type inpatient    Arrived From home    Referral Source admission list;emergency department    Reason for Consult discharge planning    Preferred Language English               Functional Status     Row Name 12/02/22 1743       Functional Status, IADL    Medications independent    Meal Preparation independent    Housekeeping independent    Laundry independent    Shopping independent       Mental Status Summary    Recent Changes in Mental Status/Cognitive Functioning unable to assess       Employment/    Employment Status unemployed               Psychosocial    No documentation.                Abuse/Neglect    No documentation.                Legal    No documentation.                Substance Abuse    No documentation.                Patient Forms    No documentation.                   HARRIET Hernandez

## 2022-12-02 NOTE — H&P
[facility] Medicine Services  HISTORY AND PHYSICAL    Patient Name: Marcia Goel  : 1985  MRN: [id number]  Primary Care Physician: Mily, No Known  Date of admission: 2022    Subjective   Subjective     Chief Complaint:  Leg Swelling/ Pain    HPI:  Marcia Goel is a 37 y.o. female with pmh significant for HTN, IVDA/ tobacco/ drug use, anxiety, migraines, bacteremia and endocarditis presents with increase leg pain, swelling abscess to thigh. Patient reports having swelling in BLLE starting a couple of weeks ago worsening in last few days. Also in past few days noticing pelvic pain, redness to lower extremities extending up to groin and palpable lymph nodes in groin. Abscess on right thigh appeared in last 24 hrs. No fever, but has chills. No n/v, but reports low abdominal pelvic pain. Currently having ~ 1mos difficulty urinating with cloudy and foul smelling urine.         Review of Systems   Constitutional: Positive for activity change, chills and fever.   HENT: Negative.    Eyes: Negative.    Respiratory: Negative.    Cardiovascular: Positive for leg swelling.   Gastrointestinal: Positive for abdominal pain.   Endocrine: Negative.    Genitourinary: Positive for difficulty urinating, dysuria and pelvic pain.   Musculoskeletal: Positive for arthralgias.   Skin: Positive for color change and wound.   Hematological: Positive for adenopathy.   Psychiatric/Behavioral: Negative.         All other systems reviewed and are negative.     Personal History     Past Medical History:   Diagnosis Date   • Abnormal drug screen 3/15/2018   • Endocarditis    • Fracture of lumbar vertebra ([facility])     age 14   • Fractures    • History of severe pre-eclampsia    • Hypertension    • IV drug abuse ([facility])    • Kidney infection    • Kidney stone    • Migraine    • Preeclampsia, severe    • Seizures ([facility])     one seizure post partum period preeclampsia   • Urinary tract infection               Past Surgical History:   Procedure Laterality Date   • BREAST AUGMENTATION Bilateral    •  SECTION      x 2-   ,    • COLONOSCOPY  2005    bleeding, attempt at EGD unsuccessful-polyps found [facility]   • KIDNEY STONE SURGERY     • WISDOM TOOTH EXTRACTION         Family History: family history includes Anemia in her daughter and son; Arthritis in her mother and paternal grandmother; Cancer in her father and paternal grandmother; Depression in her maternal aunt and mother; Diabetes in her father and paternal grandfather; Heart disease in her maternal grandmother and paternal grandmother; Hyperlipidemia in her paternal grandfather; Hypertension in her father, maternal aunt, maternal grandfather, mother, and paternal grandfather; Migraines in her maternal aunt; Mitochondrial disorder in her son; Neutropenia in her daughter and son; No Known Problems in her brother, brother, and brother; Thyroid disease in her maternal aunt. Otherwise pertinent FHx was reviewed and unremarkable.     Social History:  reports that she has quit smoking. She has never used smokeless tobacco. She reports current drug use. Drug: Cocaine(coke). She reports that she does not drink alcohol.  Social History     Social History Narrative    Working part time       Medications:       Allergies   Allergen Reactions   • Benadryl [Diphenhydramine] Shortness Of Breath     Heart racing, muscle cramping   • Vancomycin Shortness Of Breath     Red man syndrome   • Compazine [Prochlorperazine] Other (See Comments)     Restless and agitation   • Reglan [Metoclopramide] Other (See Comments)     agitation   • Toradol [Ketorolac Tromethamine] Other (See Comments)     Heart racing and increased BP   • Triptans Other (See Comments)     restless       Objective   Objective     Vital Signs:   Temp:  [98.6 °F (37 °C)] 98.6 °F (37 °C)  Heart Rate:  [108] 108  Resp:  [18] 18  BP: (171)/(114) 171/114    Physical Exam   Constitutional: Awake,  alert restless in bed  Eyes: PERRLA, sclerae anicteric, no conjunctival injection  HENT: NCAT, mucous membranes moist  Neck: Supple, no thyromegaly, no lymphadenopathy, trachea midline  Respiratory: Clear to auscultation bilaterally, nonlabored respirations   Cardiovascular: tachy but regular, no murmurs, rubs, or gallops, palpable pedal pulses bilaterally  Gastrointestinal: Positive bowel sounds, soft, +pelvic/ low abdominal tenderness, nondistended. Few LN palpable BL groin  Musculoskeletal: + LE edema R>L  Psychiatric: restless affect, cooperative  Neurologic: Oriented x 3, APPLE, speech clear  Skin: multiple skin lesions- self inflicted from scratching, abscess with surrounding bruising lateral right thigh above the knee. Redness/ blotchiness noted to hands/ knuckles, BLE      Result Review:  I have personally reviewed the results from the time of this admission to 12/2/2022 16:08 EST and agree with these findings:  [x]  Laboratory list / accordion  []  Microbiology  [x]  Radiology  [x]  EKG/Telemetry   []  Cardiology/Vascular   []  Pathology  []  Old records  []  Other:  Most notable findings include:     LAB RESULTS:      Lab 12/02/22  1034 12/02/22  0920   WBC  --  5.22   HEMOGLOBIN  --  12.5   HEMATOCRIT  --  39.7   PLATELETS  --  283   NEUTROS ABS  --  3.42   IMMATURE GRANS (ABS)  --  0.01   LYMPHS ABS  --  1.15   MONOS ABS  --  0.45   EOS ABS  --  0.16   MCV  --  97.1*   PROCALCITONIN  --  0.61*   LACTATE 0.7  --          Lab 12/02/22  0920   SODIUM 140   POTASSIUM 3.6   CHLORIDE 104   CO2 26.0   ANION GAP 10.0   BUN 10   CREATININE 0.74   EGFR 105.7   GLUCOSE 87   CALCIUM 9.1         Lab 12/02/22  0920   TOTAL PROTEIN 8.0   ALBUMIN 3.70   GLOBULIN 4.3   ALT (SGPT) 44*   AST (SGOT) 54*   BILIRUBIN 0.5   ALK PHOS 52         Lab 12/02/22  0920   PROBNP 387.0   TROPONIN T <0.010                 Brief Urine Lab Results  (Last result in the past 365 days)      Color   Clarity   Blood   Leuk Est   Nitrite    Protein   CREAT   Urine HCG        12/02/22 1109               Negative       12/02/22 1109 Yellow   Turbid   Negative   Negative   Negative   Negative               Microbiology Results (last 10 days)     ** No results found for the last 240 hours. **          XR Chest 1 View    Result Date: 12/2/2022   DATE OF EXAM: 12/2/2022 10:23 AM  PROCEDURE: XR CHEST 1 VW-  INDICATIONS: bilateral leg swelling  COMPARISON: 5/20/2021  TECHNIQUE: Single view  FINDINGS:  Study is limited by overlying support and monitoring apparatus.  The heart size is within normal limits. Pulmonary vascularity is within normal limits. Linear opacities noted at the lung bases compatible with scarring or atelectasis.. Osseous structures are unremarkable      Impression: IMPRESSION : Linear opacities at the lung bases compatible scarring or atelectasis.  Otherwise unremarkable exam[  This report was finalized on 12/2/2022 10:51 AM by Lionel Johsua.      Duplex Venous Lower Extremity - Bilateral    Result Date: 12/2/2022  •  Normal bilateral lower extremity venous duplex scan.           Assessment & Plan   Assessment & Plan       Hypertension    Migraine    History of endocarditis    Cellulitis of extremity    Drug use    History of DVT (deep vein thrombosis)      Cellulitis BLE and abscess RLE  -- BL venous duplex of legs negative  -- I&D in ED completed and wound cx sent  -- blood cx pending  -- started on daptomycin and rocephin    Dysuria  Pelvic Pain  -- obtain CT abd/ pelvis  -- urine bland but has some bacteria, continue abx   -- add cx and chlamydia, trich, NG urine panel    HTN  -- not taking medication at home  -- start metoprolol bid, prn labetalol ordered    History of DVTs  -- not compliant with AC  -- duplex negative  -- continue lovenox dvt ppx    History of IVDA  History of Endocarditis/ bacteremia  -- denies IVDA in last 6 mos  -- admits to using cocaine, correlates with UDS        DVT prophylaxis:  lovenox    CODE STATUS:     Level Of Support Discussed With: Patient  Code Status (Patient has no pulse and is not breathing): CPR (Attempt to Resuscitate)  Medical Interventions (Patient has pulse or is breathing): Full Support      This note has been completed as part of a split-shared workflow.     Electronically signed by CAM Tan, 12/02/22, 4:08 PM EST.    Patient seen and examined at the bedside.  Patient is a 37-year-old  female with past medical history significant for IV drug abuse, history of bacteremia and endocarditis, hypertension.  Evidently patient has had swelling of lower extremities bilaterally although more on the right compared to the left since several days ago.  During the past week she has noticed patchy redness that is bilateral on lower extremities and getting worse.  Also complains of abdominal/pelvic pain and palpable lymph nodes in the groin.  Patient also has noticed an abscess with some drainage on the lateral aspect of right thigh since couple of days ago.  No fever but has had occasional chills.  Denies any nausea or vomiting.  No chest pain or shortness of breath or palpitations.    Physical exam:  Constitutional: No acute distress  HENT: NCAT, mucous membranes moist  Respiratory: Clear to auscultation bilaterally, respiratory effort normal   Cardiovascular: RRR, no murmurs, rubs, or gallops  Gastrointestinal: Positive bowel sounds, soft, nontender, nondistended  Musculoskeletal:  bilateral ankle edema more on the right side compared to left, abscess on lateral aspect of right thigh.  Psychiatric: Appropriate affect, cooperative  Neurologic: Awake, alert, oriented x3, no focality appreciated, speech clear  Skin: Patchy redness over the lower extremities bilaterally.    Assessment and plan:  Findings are most compatible with cellulitis of lower extremities bilaterally.  Also has abscess of lateral aspect of right thigh s/p I&D at the ER.  Patient will be admitted and will start IV  antibiotics.  Consult ID in AM.    Please see above for more details.  Patient will be admitted as inpatient.

## 2022-12-03 ENCOUNTER — APPOINTMENT (OUTPATIENT)
Dept: CARDIOLOGY | Facility: HOSPITAL | Age: 37
End: 2022-12-03

## 2022-12-03 LAB
ASCENDING AORTA: 2.4 CM
BH CV ECHO MEAS - AO MAX PG: 9 MMHG
BH CV ECHO MEAS - AO MEAN PG: 5 MMHG
BH CV ECHO MEAS - AO ROOT DIAM: 2.9 CM
BH CV ECHO MEAS - AO V2 MAX: 146 CM/SEC
BH CV ECHO MEAS - AO V2 VTI: 31.9 CM
BH CV ECHO MEAS - EF(MOD-BP): 61 %
BH CV ECHO MEAS - IVSD: 1.2 CM
BH CV ECHO MEAS - LA DIMENSION: 2.7 CM
BH CV ECHO MEAS - LAT PEAK E' VEL: 13.5 CM/SEC
BH CV ECHO MEAS - LV MAX PG: 7 MMHG
BH CV ECHO MEAS - LV MEAN PG: 4 MMHG
BH CV ECHO MEAS - LV V1 MAX: 129 CM/SEC
BH CV ECHO MEAS - LV V1 VTI: 27.1 CM
BH CV ECHO MEAS - LVIDD: 3.3 CM
BH CV ECHO MEAS - LVIDS: 2.3 CM
BH CV ECHO MEAS - LVOT DIAM: 2 CM
BH CV ECHO MEAS - LVPWD: 1.2 CM
BH CV ECHO MEAS - MED PEAK E' VEL: 11.2 CM/SEC
BH CV ECHO MEAS - MV A MAX VEL: 91.2 CM/SEC
BH CV ECHO MEAS - MV DEC TIME: 214 MSEC
BH CV ECHO MEAS - MV E MAX VEL: 111 CM/SEC
BH CV ECHO MEAS - MV E/A: 1.2
BH CV ECHO MEAS - MV MAX PG: 6 MMHG
BH CV ECHO MEAS - MV MEAN PG: 2 MMHG
BH CV ECHO MEAS - MV P1/2T: 71 MSEC
BH CV ECHO MEAS - MV V2 VTI: 31.1 CM
BH CV ECHO MEAS - MVA(P1/2T): 3.1 CM2
BH CV ECHO MEAS - RAP SYSTOLE: 15 MMHG
BH CV ECHO MEAS - RVSP: 51 MMHG
BH CV ECHO MEAS - TAPSE (>1.6): 2.28 CM
BH CV ECHO MEAS - TR MAX PG: 36 MMHG
BH CV ECHO MEAS - TR MAX VEL: 298 CM/SEC
BH CV ECHO MEASUREMENTS AVERAGE E/E' RATIO: 8.99
BH CV VAS BP LEFT ARM: NORMAL MMHG
BH CV XLRA - RV BASE: 3.7 CM
BH CV XLRA - RV LENGTH: 6 CM
BH CV XLRA - RV MID: 2.3 CM
BH CV XLRA - TDI S': 11.6 CM/SEC
LEFT ATRIUM VOLUME INDEX: 22.5 ML/M2
LV EF 2D ECHO EST: 60 %
MAXIMAL PREDICTED HEART RATE: 181 BPM
STRESS TARGET HR: 154 BPM

## 2022-12-03 PROCEDURE — 99232 SBSQ HOSP IP/OBS MODERATE 35: CPT | Performed by: INTERNAL MEDICINE

## 2022-12-03 PROCEDURE — 25010000002 DAPTOMYCIN PER 1 MG: Performed by: NURSE PRACTITIONER

## 2022-12-03 PROCEDURE — 25010000002 CEFTRIAXONE PER 250 MG: Performed by: NURSE PRACTITIONER

## 2022-12-03 PROCEDURE — 93306 TTE W/DOPPLER COMPLETE: CPT | Performed by: INTERNAL MEDICINE

## 2022-12-03 PROCEDURE — 93306 TTE W/DOPPLER COMPLETE: CPT

## 2022-12-03 PROCEDURE — 25010000002 ENOXAPARIN PER 10 MG: Performed by: NURSE PRACTITIONER

## 2022-12-03 RX ADMIN — Medication 10 ML: at 09:10

## 2022-12-03 RX ADMIN — ACETAMINOPHEN 650 MG: 325 TABLET ORAL at 20:22

## 2022-12-03 RX ADMIN — ENOXAPARIN SODIUM 40 MG: 40 INJECTION SUBCUTANEOUS at 09:10

## 2022-12-03 RX ADMIN — DOCUSATE SODIUM 50 MG AND SENNOSIDES 8.6 MG 2 TABLET: 8.6; 5 TABLET, FILM COATED ORAL at 20:06

## 2022-12-03 RX ADMIN — METOPROLOL TARTRATE 25 MG: 25 TABLET, FILM COATED ORAL at 09:09

## 2022-12-03 RX ADMIN — Medication 1 CAPSULE: at 09:09

## 2022-12-03 RX ADMIN — Medication 10 ML: at 20:20

## 2022-12-03 RX ADMIN — SODIUM CHLORIDE 1 G: 900 INJECTION INTRAVENOUS at 20:06

## 2022-12-03 RX ADMIN — DAPTOMYCIN 400 MG: 500 INJECTION, POWDER, LYOPHILIZED, FOR SOLUTION INTRAVENOUS at 16:19

## 2022-12-03 RX ADMIN — DOCUSATE SODIUM 50 MG AND SENNOSIDES 8.6 MG 2 TABLET: 8.6; 5 TABLET, FILM COATED ORAL at 09:09

## 2022-12-03 RX ADMIN — METOPROLOL TARTRATE 25 MG: 25 TABLET, FILM COATED ORAL at 20:06

## 2022-12-03 NOTE — NURSING NOTE
WOC team consulted for: Right thigh abscess    Patient in bed, visitor asleep on bedside couch.    Identified incision from abscess completed by ED physician Per chart.  Patient would not allow WOC RN to probe for depth or to pack wound as it was too painful.  Wound bed has slough, is red, full-thickness and scant yellow drainage, periwound skin is swollen and red and painful.  Cleansed with normal saline, patted dry, applied skin protectant to periwound skin, applied Thera honey gel to wound bed and covered with a silicone foam border dressing        Please see wound care orders for recommendations.    Sensory Perception: 3-->slightly limited  Moisture: 3-->occasionally moist  Activity: 4-->walks frequently  Mobility: 3-->slightly limited  Nutrition: 3-->adequate  Friction and Shear: 3-->no apparent problem  Baldev Score: 19 (12/03/22 0910)     Please implement pressure injury prevention interventions per protocol.    Thank you for the consult.  WOC team will sign off.  If alteration to skin integrity or change in wound bed presentation please consult the WOC team.

## 2022-12-03 NOTE — PROGRESS NOTES
[facility]  PROGRESS NOTE    Patient Name: Marcia Goel  : 1985  MRN: [id number]    Date of Admission: 2022  Primary Care Physician: Provider, No Known    Subjective   Subjective     CC: f/u abscess    HPI: Up in chair with very pressured speech. Complaining of pelvic/upper leg pain. Complaining that her arms/legs are swollen.    ROS:  Gen- No fevers, chills  CV- No chest pain, palpitations  Resp- No cough, dyspnea  GI- No N/V/D, abd pain       Objective   Objective     Vital Signs:   Temp:  [97.8 °F (36.6 °C)-98.6 °F (37 °C)] 97.8 °F (36.6 °C)  Heart Rate:  [63-88] 70  Resp:  [16-19] 18  BP: (108-160)/() 108/77     Physical Exam:  Constitutional: No acute distress, chronically ill appearing, appears older that stated age  HENT: NCAT, mucous membranes moist  Respiratory: Clear to auscultation bilaterally, respiratory effort normal   Cardiovascular: RRR, no murmurs, rubs, or gallops  Gastrointestinal: Positive bowel sounds, soft, nontender, nondistended  Musculoskeletal: Upper/lower ext noted. LE with multiple skin lesions.   Psychiatric: Appropriate affect, cooperative  Neurologic: Oriented x 3, strength symmetric in all extremities, Cranial Nerves grossly intact to confrontation, speech clear  Skin: BLE rash    Results Reviewed:  LAB RESULTS:      Lab 22  1034 22  0920   WBC  --  5.22   HEMOGLOBIN  --  12.5   HEMATOCRIT  --  39.7   PLATELETS  --  283   NEUTROS ABS  --  3.42   IMMATURE GRANS (ABS)  --  0.01   LYMPHS ABS  --  1.15   MONOS ABS  --  0.45   EOS ABS  --  0.16   MCV  --  97.1*   PROCALCITONIN  --  0.61*   LACTATE 0.7  --          Lab 22  0920   SODIUM 140   POTASSIUM 3.6   CHLORIDE 104   CO2 26.0   ANION GAP 10.0   BUN 10   CREATININE 0.74   EGFR 105.7   GLUCOSE 87   CALCIUM 9.1         Lab 22  0920   TOTAL PROTEIN 8.0   ALBUMIN 3.70   GLOBULIN 4.3   ALT (SGPT) 44*   AST (SGOT) 54*   BILIRUBIN 0.5   ALK PHOS 52          Lab 12/02/22  0920   PROBNP 387.0   TROPONIN T <0.010                 Brief Urine Lab Results  (Last result in the past 365 days)      Color   Clarity   Blood   Leuk Est   Nitrite   Protein   CREAT   Urine HCG        12/02/22 1109               Negative       12/02/22 1109 Yellow   Turbid   Negative   Negative   Negative   Negative                 Microbiology Results Abnormal     Procedure Component Value - Date/Time    Wound Culture - Swab, Leg, Right [[id number]] Collected: 12/02/22 1744    Lab Status: Preliminary result Specimen: Swab from Leg, Right Updated: 12/02/22 1832     Gram Stain No WBCs or organisms seen    COVID PRE-OP / PRE-PROCEDURE SCREENING ORDER (NO ISOLATION) - Swab, Nasopharynx [[id number]]  (Normal) Collected: 12/02/22 1547    Lab Status: Final result Specimen: Swab from Nasopharynx Updated: 12/02/22 1701    Narrative:      The following orders were created for panel order COVID PRE-OP / PRE-PROCEDURE SCREENING ORDER (NO ISOLATION) - Swab, Nasopharynx.  Procedure                               Abnormality         Status                     ---------                               -----------         ------                     COVID-19 and FLU A/B PCR...[[id number]]  Normal              Final result                 Please view results for these tests on the individual orders.    COVID-19 and FLU A/B PCR - Swab, Nasopharynx [[id number]]  (Normal) Collected: 12/02/22 1547    Lab Status: Final result Specimen: Swab from Nasopharynx Updated: 12/02/22 1701     COVID19 Not Detected     Influenza A PCR Not Detected     Influenza B PCR Not Detected    Narrative:      Fact sheet for providers: https://www.fda.gov/media/434668/download    Fact sheet for patients: https://www.fda.gov/media/705619/download    Test performed by PCR.          XR Chest 1 View    Result Date: 12/2/2022   DATE OF EXAM: 12/2/2022 10:23 AM  PROCEDURE: XR CHEST 1 VW-  INDICATIONS: bilateral leg swelling  COMPARISON: 5/20/2021   TECHNIQUE: Single view  FINDINGS:  Study is limited by overlying support and monitoring apparatus.  The heart size is within normal limits. Pulmonary vascularity is within normal limits. Linear opacities noted at the lung bases compatible with scarring or atelectasis.. Osseous structures are unremarkable      Impression: IMPRESSION : Linear opacities at the lung bases compatible scarring or atelectasis.  Otherwise unremarkable exam[  This report was finalized on 12/2/2022 10:51 AM by Lionel Joshua.      Duplex Venous Lower Extremity - Bilateral    Result Date: 12/2/2022  •  Normal bilateral lower extremity venous duplex scan.           I have reviewed the medications:  Scheduled Meds:cefTRIAXone, 1 g, Intravenous, Q24H  DAPTOmycin, 400 mg, Intravenous, Q24H  enoxaparin, 40 mg, Subcutaneous, Daily  lactobacillus acidophilus, 1 capsule, Oral, Daily  metoprolol tartrate, 25 mg, Oral, Q12H  senna-docusate sodium, 2 tablet, Oral, BID  sodium chloride, 10 mL, Intravenous, Q12H      Continuous Infusions:   PRN Meds:.•  acetaminophen  •  aluminum-magnesium hydroxide-simethicone  •  senna-docusate sodium **AND** polyethylene glycol **AND** bisacodyl **AND** bisacodyl  •  labetalol  •  ondansetron  •  sodium chloride  •  sodium chloride  •  sodium chloride  •  temazepam    Assessment & Plan   Assessment & Plan     Active Hospital Problems    Diagnosis  POA   • **History of endocarditis [Z86.79]  Not Applicable   • Cellulitis of extremity [L03.119]  Unknown   • Drug use [F19.90]  Unknown   • History of DVT (deep vein thrombosis) [Z86.718]  Not Applicable   • Dysuria [R30.0]  Unknown   • Migraine [G43.909]  Yes   • Hypertension [I10]  Yes      Resolved Hospital Problems   No resolved problems to display.        Brief Hospital Course to date:  Marcia Goel is a 37 y.o. female with past medical history significant for IV drug abuse, history of bacteremia and endocarditis, hypertension.  Evidently patient has had swelling of  lower extremities bilaterally although more on the right compared to the left since several days ago.  During the past week she has noticed patchy redness that is bilateral on lower extremities and getting worse. Also complains of abdominal/pelvic pain and palpable lymph nodes in the groin. Patient also has noticed an abscess with some drainage on the lateral aspect of right thigh since couple of days ago.    Cellulitis BLE and abscess RLE  -- BL venous duplex of legs negative. Abscess was I&D in ED. CX pending. Blood cx NGTD.  -- Continue IV daptomycin and rocephin.  -- ID consulted last pm.   -- Echo pending.  -- Stop IVF.     Dysuria  Pelvic Pain  -- CT A/P completed with read pending.  -- Urine cx and chlamydia, trich, NG pending.     HTN  -- not taking medication at home  -- start metoprolol bid, prn labetalol ordered     History of DVTs  -- not compliant with AC  -- duplex negative  -- continue lovenox dvt ppx     History of IVDA  History of Endocarditis/ bacteremia  -- denies IVDA in last 6 mos  -- admits to using cocaine, correlates with UDS     This patient's problems and plans were partially entered by my partner and updated as appropriate by me 12/03/22.     DVT prophylaxis:  lovenox    Expected Discharge Location and Transportation: home  Expected Discharge Date: 12/5    DVT prophylaxis:  Medical DVT prophylaxis orders are present.          CODE STATUS:   Code Status and Medical Interventions:   Ordered at: 12/02/22 1605     Level Of Support Discussed With:    Patient     Code Status (Patient has no pulse and is not breathing):    CPR (Attempt to Resuscitate)     Medical Interventions (Patient has pulse or is breathing):    Full Support       Kay Ahn II, DO  12/03/22

## 2022-12-03 NOTE — PLAN OF CARE
Goal Outcome Evaluation:               AO x4. NSR on monitor. Room air. VSS. Bilateral LE edema. Pt complaining of increased pain in LE. NS w/ KCI @ 100 ml/hr. Pt requesting for fluids to be D/C today.

## 2022-12-04 LAB
ANION GAP SERPL CALCULATED.3IONS-SCNC: 12 MMOL/L (ref 5–15)
BACTERIA SPEC AEROBE CULT: ABNORMAL
BASOPHILS # BLD AUTO: 0.03 10*3/MM3 (ref 0–0.2)
BASOPHILS NFR BLD AUTO: 0.8 % (ref 0–1.5)
BUN SERPL-MCNC: 10 MG/DL (ref 6–20)
BUN/CREAT SERPL: 13 (ref 7–25)
CALCIUM SPEC-SCNC: 9 MG/DL (ref 8.6–10.5)
CHLORIDE SERPL-SCNC: 105 MMOL/L (ref 98–107)
CK SERPL-CCNC: 82 U/L (ref 20–180)
CO2 SERPL-SCNC: 23 MMOL/L (ref 22–29)
CREAT SERPL-MCNC: 0.77 MG/DL (ref 0.57–1)
CRP SERPL-MCNC: 0.67 MG/DL (ref 0–0.5)
CRP SERPL-MCNC: 3.83 MG/DL (ref 0–0.5)
DEPRECATED RDW RBC AUTO: 54.4 FL (ref 37–54)
EGFRCR SERPLBLD CKD-EPI 2021: 100.8 ML/MIN/1.73
EOSINOPHIL # BLD AUTO: 0.15 10*3/MM3 (ref 0–0.4)
EOSINOPHIL NFR BLD AUTO: 3.8 % (ref 0.3–6.2)
ERYTHROCYTE [DISTWIDTH] IN BLOOD BY AUTOMATED COUNT: 14.7 % (ref 12.3–15.4)
ERYTHROCYTE [SEDIMENTATION RATE] IN BLOOD: 21 MM/HR (ref 0–20)
GLUCOSE SERPL-MCNC: 86 MG/DL (ref 65–99)
HCT VFR BLD AUTO: 37.3 % (ref 34–46.6)
HGB BLD-MCNC: 11.3 G/DL (ref 12–15.9)
IMM GRANULOCYTES # BLD AUTO: 0.01 10*3/MM3 (ref 0–0.05)
IMM GRANULOCYTES NFR BLD AUTO: 0.3 % (ref 0–0.5)
LYMPHOCYTES # BLD AUTO: 1.1 10*3/MM3 (ref 0.7–3.1)
LYMPHOCYTES NFR BLD AUTO: 27.5 % (ref 19.6–45.3)
MCH RBC QN AUTO: 30.4 PG (ref 26.6–33)
MCHC RBC AUTO-ENTMCNC: 30.3 G/DL (ref 31.5–35.7)
MCV RBC AUTO: 100.3 FL (ref 79–97)
MONOCYTES # BLD AUTO: 0.34 10*3/MM3 (ref 0.1–0.9)
MONOCYTES NFR BLD AUTO: 8.5 % (ref 5–12)
MRSA DNA SPEC QL NAA+PROBE: POSITIVE
NEUTROPHILS NFR BLD AUTO: 2.37 10*3/MM3 (ref 1.7–7)
NEUTROPHILS NFR BLD AUTO: 59.1 % (ref 42.7–76)
NRBC BLD AUTO-RTO: 0 /100 WBC (ref 0–0.2)
PLATELET # BLD AUTO: 194 10*3/MM3 (ref 140–450)
PMV BLD AUTO: 10.5 FL (ref 6–12)
POTASSIUM SERPL-SCNC: 4.1 MMOL/L (ref 3.5–5.2)
RBC # BLD AUTO: 3.72 10*6/MM3 (ref 3.77–5.28)
SODIUM SERPL-SCNC: 140 MMOL/L (ref 136–145)
WBC NRBC COR # BLD: 4 10*3/MM3 (ref 3.4–10.8)

## 2022-12-04 PROCEDURE — 80048 BASIC METABOLIC PNL TOTAL CA: CPT | Performed by: INTERNAL MEDICINE

## 2022-12-04 PROCEDURE — 86140 C-REACTIVE PROTEIN: CPT | Performed by: INTERNAL MEDICINE

## 2022-12-04 PROCEDURE — 87641 MR-STAPH DNA AMP PROBE: CPT | Performed by: INTERNAL MEDICINE

## 2022-12-04 PROCEDURE — 25010000002 CEFTRIAXONE PER 250 MG: Performed by: NURSE PRACTITIONER

## 2022-12-04 PROCEDURE — 85025 COMPLETE CBC W/AUTO DIFF WBC: CPT | Performed by: INTERNAL MEDICINE

## 2022-12-04 PROCEDURE — 25010000002 ENOXAPARIN PER 10 MG: Performed by: NURSE PRACTITIONER

## 2022-12-04 PROCEDURE — 99232 SBSQ HOSP IP/OBS MODERATE 35: CPT | Performed by: INTERNAL MEDICINE

## 2022-12-04 PROCEDURE — 85652 RBC SED RATE AUTOMATED: CPT | Performed by: INTERNAL MEDICINE

## 2022-12-04 RX ORDER — TORSEMIDE 20 MG/1
60 TABLET ORAL DAILY
Status: DISCONTINUED | OUTPATIENT
Start: 2022-12-04 | End: 2022-12-04

## 2022-12-04 RX ORDER — OXYCODONE HYDROCHLORIDE AND ACETAMINOPHEN 5; 325 MG/1; MG/1
1 TABLET ORAL EVERY 6 HOURS PRN
Status: DISCONTINUED | OUTPATIENT
Start: 2022-12-04 | End: 2022-12-05 | Stop reason: HOSPADM

## 2022-12-04 RX ORDER — OXYCODONE HYDROCHLORIDE AND ACETAMINOPHEN 5; 325 MG/1; MG/1
1 TABLET ORAL EVERY 4 HOURS PRN
Status: DISCONTINUED | OUTPATIENT
Start: 2022-12-04 | End: 2022-12-04

## 2022-12-04 RX ORDER — CEPHALEXIN 250 MG/1
500 CAPSULE ORAL EVERY 8 HOURS SCHEDULED
Status: DISCONTINUED | OUTPATIENT
Start: 2022-12-05 | End: 2022-12-05 | Stop reason: HOSPADM

## 2022-12-04 RX ORDER — TORSEMIDE 20 MG/1
60 TABLET ORAL ONCE
Status: COMPLETED | OUTPATIENT
Start: 2022-12-04 | End: 2022-12-04

## 2022-12-04 RX ORDER — CLINDAMYCIN HYDROCHLORIDE 150 MG/1
300 CAPSULE ORAL EVERY 8 HOURS SCHEDULED
Status: DISCONTINUED | OUTPATIENT
Start: 2022-12-05 | End: 2022-12-05 | Stop reason: HOSPADM

## 2022-12-04 RX ADMIN — TEMAZEPAM 15 MG: 15 CAPSULE ORAL at 20:34

## 2022-12-04 RX ADMIN — ENOXAPARIN SODIUM 40 MG: 40 INJECTION SUBCUTANEOUS at 09:54

## 2022-12-04 RX ADMIN — SODIUM CHLORIDE 1 G: 900 INJECTION INTRAVENOUS at 20:34

## 2022-12-04 RX ADMIN — Medication 10 ML: at 09:54

## 2022-12-04 RX ADMIN — CLINDAMYCIN HYDROCHLORIDE 300 MG: 150 CAPSULE ORAL at 23:37

## 2022-12-04 RX ADMIN — OXYCODONE HYDROCHLORIDE AND ACETAMINOPHEN 1 TABLET: 5; 325 TABLET ORAL at 16:23

## 2022-12-04 RX ADMIN — Medication 10 ML: at 20:34

## 2022-12-04 RX ADMIN — Medication 1 CAPSULE: at 09:54

## 2022-12-04 RX ADMIN — DOCUSATE SODIUM 50 MG AND SENNOSIDES 8.6 MG 2 TABLET: 8.6; 5 TABLET, FILM COATED ORAL at 09:54

## 2022-12-04 RX ADMIN — METOPROLOL TARTRATE 25 MG: 25 TABLET, FILM COATED ORAL at 09:53

## 2022-12-04 RX ADMIN — METOPROLOL TARTRATE 25 MG: 25 TABLET, FILM COATED ORAL at 20:34

## 2022-12-04 RX ADMIN — DOCUSATE SODIUM 50 MG AND SENNOSIDES 8.6 MG 2 TABLET: 8.6; 5 TABLET, FILM COATED ORAL at 20:34

## 2022-12-04 RX ADMIN — TORSEMIDE 60 MG: 20 TABLET ORAL at 16:23

## 2022-12-04 NOTE — PLAN OF CARE
Goal Outcome Evaluation:   A&O x4. NSR. RA. Bilateral LE edema, red blotchy areas noted. Patient educated to prevent scratching on areas. Patient awake most of night.

## 2022-12-04 NOTE — CONSULTS
INFECTIOUS DISEASE CONSULT/INITIAL HOSPITAL VISIT    Marcia Goel  1985  [id number]    Date of Consult: 2022    Admission Date: 2022    Chief complaint: Right leg      Requesting Provider: Dr. Mackey  Evaluating Physician: Paul Benson MD  Reason for Consultation:  Bilateral lower extremity cellulitis     History of present illness:    Patient is a 37 y.o. female, PMH IVDU, history of endocarditis, DVTs/PTE,  seen today for . She was seen in the ED at [facility] with complaints of bilateral lower extremity edema. She had venous duplex negative for DVT. UA with 21-30 WBCs, and urine culture with Ecoli. She was discharged on Keflex. The swelling of her lower extremities failed to improve and she presented to the ED here. Several days ago she developed an abscess on her lateral right knee.  She is complaining of pelvic pain this am.  She has been afebrile. Admitting labs with CRP 3.83, ESR 21,  PCT 0.61, WBC 5.22, AST 54, ALT 44, UA 3-5 WBCs, urine culture Ecoli.  CT A/P with mildly enlarged inguinal lymph nodes, nonspecific, fatty infiltration of liver.  Venous duplex negative for DVT. CXR with linear opacities at lung bases compatible with atelectasis, or scarring. Currently on Daptomycin and Ceftriaxone and we were consulted for evaluation and treatment.   Past Medical History:   Diagnosis Date   • Abnormal drug screen 3/15/2018   • Endocarditis    • Fracture of lumbar vertebra (HCC)     age 14   • Fractures    • History of severe pre-eclampsia    • Hypertension    • IV drug abuse ([facility])    • Kidney infection    • Kidney stone    • Migraine    • Preeclampsia, severe    • Seizures ([facility]) 2007    one seizure post partum period preeclampsia   • Urinary tract infection        Past Surgical History:   Procedure Laterality Date   • BREAST AUGMENTATION Bilateral    •  SECTION      x 2-   ,    • COLONOSCOPY      bleeding, attempt at EGD unsuccessful-polyps found [facility]   •  KIDNEY STONE SURGERY     • WISDOM TOOTH EXTRACTION         Family History   Problem Relation Age of Onset   • Arthritis Mother    • Hypertension Mother    • Depression Mother    • Cancer Father         throat   • Diabetes Father    • Hypertension Father    • Heart disease Maternal Grandmother    • Hypertension Maternal Grandfather    • No Known Problems Brother    • Mitochondrial disorder Son    • Neutropenia Son    • Anemia Son         sideroblastic   • Hypertension Maternal Aunt    • Thyroid disease Maternal Aunt    • Migraines Maternal Aunt    • Depression Maternal Aunt    • Arthritis Paternal Grandmother    • Cancer Paternal Grandmother         skin   • Heart disease Paternal Grandmother         stents   • Hyperlipidemia Paternal Grandfather    • Hypertension Paternal Grandfather    • Diabetes Paternal Grandfather    • Neutropenia Daughter    • Anemia Daughter         sideroblastic   • No Known Problems Brother    • No Known Problems Brother      She is . Denies alcohol.  + coccaine use, denies IVDU x 6months  Social History     Socioeconomic History   • Marital status: Single   Tobacco Use   • Smoking status: Former   • Smokeless tobacco: Never   Substance and Sexual Activity   • Alcohol use: No   • Drug use: Yes     Types: Cocaine(coke)     Comment: INTERMITTENT IVDU AND BENZOS/PILLS   • Sexual activity: Yes     Partners: Male     Birth control/protection: Condom     Comment:        Allergies   Allergen Reactions   • Benadryl [Diphenhydramine] Shortness Of Breath     Heart racing, muscle cramping   • Vancomycin Shortness Of Breath     Red man syndrome   • Compazine [Prochlorperazine] Other (See Comments)     Restless and agitation   • Reglan [Metoclopramide] Other (See Comments)     agitation   • Toradol [Ketorolac Tromethamine] Other (See Comments)     Heart racing and increased BP   • Triptans Other (See Comments)     restless         Medication:    Current Facility-Administered Medications:    •  acetaminophen (TYLENOL) tablet 650 mg, 650 mg, Oral, Q4H PRN, Brunilda Chen, APRN, 650 mg at 12/03/22 2022  •  aluminum-magnesium hydroxide-simethicone (MAALOX MAX) 400-400-40 MG/5ML suspension 15 mL, 15 mL, Oral, Q6H PRN, Brunilda Chen, APRN  •  sennosides-docusate (PERICOLACE) 8.6-50 MG per tablet 2 tablet, 2 tablet, Oral, BID, 2 tablet at 12/04/22 2034 **AND** polyethylene glycol (MIRALAX) packet 17 g, 17 g, Oral, Daily PRN **AND** bisacodyl (DULCOLAX) EC tablet 5 mg, 5 mg, Oral, Daily PRN **AND** bisacodyl (DULCOLAX) suppository 10 mg, 10 mg, Rectal, Daily PRN, Brunilda Chen, APRN  •  cefTRIAXone (ROCEPHIN) 1 g/100 mL 0.9% NS (MBP), 1 g, Intravenous, Q24H, Brunilda Chen APRN, 1 g at 12/04/22 2034  •  Enoxaparin Sodium (LOVENOX) syringe 40 mg, 40 mg, Subcutaneous, Daily, Brunilda Chen APRN, 40 mg at 12/04/22 0954  •  labetalol (NORMODYNE,TRANDATE) injection 10 mg, 10 mg, Intravenous, Q6H PRN, Brunilda Chen, APRN  •  lactobacillus acidophilus (RISAQUAD) capsule 1 capsule, 1 capsule, Oral, Daily, Brunilda Chen APRN, 1 capsule at 12/04/22 0954  •  metoprolol tartrate (LOPRESSOR) tablet 25 mg, 25 mg, Oral, Q12H, Brunilda Chen APRN, 25 mg at 12/04/22 2034  •  ondansetron (ZOFRAN) injection 4 mg, 4 mg, Intravenous, Q6H PRN, Brunilda Chen, APRN  •  oxyCODONE-acetaminophen (PERCOCET) 5-325 MG per tablet 1 tablet, 1 tablet, Oral, Q6H PRN, Jeffy Mackey MD, 1 tablet at 12/04/22 1623  •  sodium chloride 0.9 % flush 10 mL, 10 mL, Intravenous, PRN, Jcarlos Kerr MD  •  sodium chloride 0.9 % flush 10 mL, 10 mL, Intravenous, Q12H, Brunilda Chen, APRN, 10 mL at 12/04/22 2034  •  sodium chloride 0.9 % flush 10 mL, 10 mL, Intravenous, PRN, Brunilda Chen, APRN  •  sodium chloride 0.9 % infusion 40 mL, 40 mL, Intravenous, PRN, Brunilda Chen, APRN  •  temazepam (RESTORIL) capsule 15 mg, 15 mg, Oral, Nightly PRN, Jeffy Mackey MD, 15 mg at 12/04/22      Antibiotics:  Anti-Infectives (From admission, onward)    Ordered     Dose/Rate Route Frequency Start Stop    22 181  cefTRIAXone (ROCEPHIN) 1 g/100 mL 0.9% NS (MBP)        Ordering Provider: Brunilda Chen APRN    1 g  over 30 Minutes Intravenous Every 24 Hours 22 1431  DAPTOmycin (CUBICIN) 400 mg in sodium chloride 0.9 % 50 mL IVPB        Ordering Provider: Jacinto Tierney APRN    8 mg/kg × 51.4 kg (Adjusted)  100 mL/hr over 30 Minutes Intravenous Once 22 1433 22 1633            Review of Systems:  Constitutional-- No Fever, chills or sweats.  Appetite good, and no malaise. No fatigue.  HEENT-- No new vision, hearing or throat complaints.  No epistaxis or oral sores.  Denies odynophagia or dysphagia. No headache, photophobia or neck stiffness.  CV-- No chest pain, palpitation or syncope  Resp-- No SOB/cough/Hemoptysis  GI- No nausea, vomiting, or diarrhea.  No hematochezia, melena, or hematemesis. Denies jaundice or chronic liver disease.  -- No dysuria, hematuria, or flank pain.  Denies hesitancy, urgency or flank pain.  Lymph- no swollen lymph nodes in neck/axilla or groin.   Heme- No active bruising or bleeding; no Hx of DVT or PE.  MS-- no swelling or pain in the bones or joints of arms/legs.  No new back pain.  Neuro-- No acute focal weakness or numbness in the arms or legs.  No seizures.  Skin--No rashes or lesions      Physical Exam:   Vital Signs  Temp (24hrs), Av.8 °F (36.6 °C), Min:97.5 °F (36.4 °C), Max:98.1 °F (36.7 °C)    Temp  Min: 97.5 °F (36.4 °C)  Max: 98.1 °F (36.7 °C)  BP  Min: 125/73  Max: 157/105  Pulse  Min: 66  Max: 85  Resp  Min: 18  Max: 20  SpO2  Min: 93 %  Max: 100 %    GENERAL: Awake and alert, in no acute distress.   HEENT: Normocephalic, atraumatic.  PERRL. EOMI. No conjunctival injection. No icterus. Oropharynx clear without evidence of thrush or exudate. No evidence of peridontal disease.    NECK: Supple without  nuchal rigidity. No mass.  LYMPH: No cervical, axillary or inguinal lymphadenopathy.  HEART: RRR; No murmur, rubs, gallops.   LUNGS: Clear to auscultation bilaterally without wheezing, rales, rhonchi. Normal respiratory effort. Nonlabored. No dullness.  ABDOMEN: Soft, nontender, nondistended. Positive bowel sounds. No rebound or guarding. NO mass or HSM.  EXT:  No cyanosis, clubbing or edema. No cord.  :  Without Lambert catheter.  MSK: No joint effusions or erythema  SKIN: Warm and dry without cutaneous eruptions on Inspection/palpation.    NEURO: Oriented to PPT.  Motor 5/5 strength  PSYCHIATRIC: Normal insight and judgment. Cooperative with PE    Laboratory Data    Results from last 7 days   Lab Units 12/04/22  0917 12/02/22  0920   WBC 10*3/mm3 4.00 5.22   HEMOGLOBIN g/dL 11.3* 12.5   HEMATOCRIT % 37.3 39.7   PLATELETS 10*3/mm3 194 283     Results from last 7 days   Lab Units 12/04/22  1845   SODIUM mmol/L 140   POTASSIUM mmol/L 4.1   CHLORIDE mmol/L 105   CO2 mmol/L 23.0   BUN mg/dL 10   CREATININE mg/dL 0.77   GLUCOSE mg/dL 86   CALCIUM mg/dL 9.0     Results from last 7 days   Lab Units 12/02/22  0920   ALK PHOS U/L 52   BILIRUBIN mg/dL 0.5   ALT (SGPT) U/L 44*   AST (SGOT) U/L 54*     Results from last 7 days   Lab Units 12/04/22  0917   SED RATE mm/hr 21*     Results from last 7 days   Lab Units 12/04/22  1845   CRP mg/dL 0.67*     Results from last 7 days   Lab Units 12/02/22  1034   LACTATE mmol/L 0.7     Results from last 7 days   Lab Units 12/02/22  0920   CK TOTAL U/L 82         Estimated Creatinine Clearance: 79.6 mL/min (by C-G formula based on SCr of 0.77 mg/dL).      Microbiology:  No results found for: ACANTHNAEG, AFBCX, BPERTUSSISCX, BLOODCX  No results found for: BCIDPCR, CXREFLEX, CSFCX, CULTURETIS  No results found for: CULTURES, HSVCX, URCX  No results found for: EYECULTURE, GCCX, HSVCULTURE, LABHSV  No results found for: LEGIONELLA, MRSACX, MUMPSCX, MYCOPLASCX  No results found for:  NOCARDIACX, STOOLCX  Urine Culture   Date Value Ref Range Status   12/02/2022 >100,000 CFU/mL Escherichia coli (A)  Final     Wound Culture   Date Value Ref Range Status   12/02/2022 No growth  Preliminary           Radiology:  Imaging Results (Last 72 Hours)     Procedure Component Value Units Date/Time    CT Abdomen Pelvis Without Contrast [[id number]] Collected: 12/03/22 1209     Updated: 12/03/22 1220    Narrative:      CT ABDOMEN PELVIS WO CONTRAST    Date of Exam: 12/2/2022 9:23 PM EST    Indication: low abdominal pain, lymph nodes palpable.  Negative bilateral lower extremity venous Doppler ultrasound 12/2/2022    Comparison: CT abdomen pelvis 10/31/2011    Technique: Axial CT images were obtained of the abdomen and pelvis without the administration of contrast. Reconstructed coronal and sagittal images were also obtained. Automated exposure control and iterative construction methods were used.    Findings:  Lower Thorax: Linear opacity in the lung bases right greater than left likely due to subsegmental atelectasis. Peritoneum: No free air or free fluid.     Appendix: Appendix is well seen and is normal.    Kidneys, ureters, and urinary bladder: No hydronephrosis. In the left kidney there is a 2 mm calculus mid aspect without obstruction.. No focal renal lesions.  Normal appearance of urinary bladder given the amount of distention.    Liver, gallbladder, and bile ducts: Subtle 2 cm area of low-density near the fossa ligament and the left medial hepatic lobe likely due to focal fatty infiltration. Gallbladder is contracted. No significant inflammation.. No bile duct dilatation.     Spleen: Spleen is normal size.  No focal splenic lesions.    Adrenal glands: Unremarkable.    Pancreas: No focal masses.  No pancreatic duct dilation. No surrounding inflammation.    Abdominal aorta and Vascular Structures: No aneurysmal dilation. No significant atherosclerotic disease.    Stomach and Bowel: No abnormally dilated  loops of bowel.  No significant hiatal hernia.  No significant bowel wall thickening.  Ligament of Treitz has normal anatomic position. Mild stool burden.    Reproductive Organs: Within normal limits.    Lymph nodes: There is a upper limits normal size retroperitoneal lymph node on the left measuring 9 mm in short axis image #40. This is similar to the previous CT. There are prominent inguinal lymph nodes present bilaterally. The largest on the right   measures 1.3 x 1.9 cm. The largest on the left measures 1.1 x 1.8 cm. There is a lymph node along the right iliac chain measuring about 9 mm in short axis on image #101.    Soft tissues: There is a fat and fluid paraumbilical hernia left of midline measuring 2.3 x 1.7 cm.    Osseous structures: No aggressive focal lytic or sclerotic osseous lesions.    Evaluation of bowel and solid organs is limited without contrast administration.      Impression:      1.There are some mildly enlarged inguinal lymph nodes. These are nonspecific. They may be reactive. Neoplastic lymphadenopathy is less likely given the appearance. Consider tissue diagnosis or follow-up clinically and/or with imaging.  2.Nonobstructing left renal calculus.  3.Probable focal fatty infiltration of the liver. This could be evaluated with dedicated liver MRI.  4.Small fat and fluid containing paraumbilical hernia measuring 2.3 cm maximally.    Electronically Signed: Juliana Galeas MD    12/3/2022 12:17 PM EST    Workstation ID: [id number]    XR Chest 1 View [[id number]] Collected: 12/02/22 1050     Updated: 12/02/22 1054    Narrative:         DATE OF EXAM:   12/2/2022 10:23 AM     PROCEDURE:   XR CHEST 1 VW-     INDICATIONS:   bilateral leg swelling     COMPARISON:  5/20/2021     TECHNIQUE:   Single view     FINDINGS:      Study is limited by overlying support and monitoring apparatus.     The heart size is within normal limits. Pulmonary vascularity is within  normal limits. Linear opacities noted at the lung  bases compatible with  scarring or atelectasis.. Osseous structures are unremarkable        Impression:      IMPRESSION :   Linear opacities at the lung bases compatible scarring or atelectasis.     Otherwise unremarkable exam[     This report was finalized on 12/2/2022 10:51 AM by Lionel Joshua.           Patient is a 37 y.o. female, PMH IVDU, history of endocarditis, DVTs/PTE,  seen today for . She was seen in the ED at [facility] 11/29/22 with complaints of bilateral lower extremity edema. She had venous duplex negative for DVT. UA with 21-30 WBCs, and urine culture with Ecoli. She was discharged on Keflex. The swelling of her lower extremities failed to improve and she presented to the ED here. Several days ago she developed an abscess on her lateral right knee. We were asked by Dr. Mackey for antibiotic management.     PROBLEM LIST:   - right posterior knee abscess, with Alpha strep on culture  - IVDU, she states she has not injected in 6 months  - Ecoli UTI  - Bilateral lower extremity swelling- negative for DVT  - History of endocarditis/septic pulmonary emboli, 2018  - Elevated LFTs    PLAN/RECOMMENDATIONS:   Thank you for asking us to see Marcia Goel, I recommend the following:  - Blood cultures, pending   -Ceftriaxone times another dose this evening then okay with Keflex 500 mg 3 times daily and clindamycin 300 mg 3 times daily x7 days upon discharge    No follow-up with ID necessary    Dr. Benson  has obtained the history, performed the physical exam and formulated the above treatment plan.        Paul Benson MD  12/4/2022  23:00 EST

## 2022-12-04 NOTE — PROGRESS NOTES
[facility] Medicine Services  PROGRESS NOTE    Patient Name: Marcia Goel  : 1985  MRN: [id number]    Date of Admission: 2022  Primary Care Physician: Provider, No Known    Subjective   Subjective     CC:   F/U abscess    HPI:   I have seen and evaluated the patient this morning.  Comfortable in bed.  Pain in her right thigh improved.  Still complaining of bilateral groin pains.    ROS:  General : no fevers, chills  CVS: No chest pain, palpitations  Respiratory: No cough, dyspnea  GI: No N/V/D, abd pain  10 point review of systems is negative except for what is mentioned in HPI    Objective   Objective     Vital Signs:   Temp:  [97.5 °F (36.4 °C)-98.2 °F (36.8 °C)] 97.5 °F (36.4 °C)  Heart Rate:  [59-92] 74  Resp:  [16-18] 18  BP: ()/(66-97) 139/94     Physical Exam:  General: Comfortable, not in distress, conversant and cooperative  Head: Atraumatic and normocephalic  Eyes: No Icterus. No pallor  Ears:  Ears appear intact with no abnormalities noted  Throat: No oral lesions, no thrush  Neck: Supple, trachea midline  Lungs: Clear to auscultation bilaterally, equal air entry, no wheezing or crackles  Heart:  Normal S1 and S2, no murmur, no gallop, No JVD, no lower extremity swelling  Abdomen:  Soft, no tenderness, no organomegaly, normal bowel sounds, no organomegaly  Extremities: pulses equal bilaterally  Skin: Multiple skin scabs all over her body.  Mainly lower extremities.  There is a well-circumscribed small drained abscess along the lateral aspect of her right knee with slight surrounding erythema  Neurologic: Cranial nerves appear intact with no evidence of facial asymmetry, normal motor and sensory functions in all 4 extremities  Psych: Alert and oriented x 3, normal mood    Results Reviewed:  LAB RESULTS:      Lab 22  1034 22  0920   WBC  --  5.22   HEMOGLOBIN  --  12.5   HEMATOCRIT  --  39.7   PLATELETS  --  283   NEUTROS ABS  --  3.42   IMMATURE  GRANS (ABS)  --  0.01   LYMPHS ABS  --  1.15   MONOS ABS  --  0.45   EOS ABS  --  0.16   MCV  --  97.1*   PROCALCITONIN  --  0.61*   LACTATE 0.7  --          Lab 12/02/22  0920   SODIUM 140   POTASSIUM 3.6   CHLORIDE 104   CO2 26.0   ANION GAP 10.0   BUN 10   CREATININE 0.74   EGFR 105.7   GLUCOSE 87   CALCIUM 9.1         Lab 12/02/22  0920   TOTAL PROTEIN 8.0   ALBUMIN 3.70   GLOBULIN 4.3   ALT (SGPT) 44*   AST (SGOT) 54*   BILIRUBIN 0.5   ALK PHOS 52         Lab 12/02/22  0920   PROBNP 387.0   TROPONIN T <0.010                 Brief Urine Lab Results  (Last result in the past 365 days)      Color   Clarity   Blood   Leuk Est   Nitrite   Protein   CREAT   Urine HCG        12/02/22 1109               Negative       12/02/22 1109 Yellow   Turbid   Negative   Negative   Negative   Negative                 Microbiology Results Abnormal     Procedure Component Value - Date/Time    Wound Culture - Swab, Leg, Right [[id number]] Collected: 12/02/22 1744    Lab Status: Preliminary result Specimen: Swab from Leg, Right Updated: 12/03/22 1221     Wound Culture No growth     Gram Stain No WBCs or organisms seen    COVID PRE-OP / PRE-PROCEDURE SCREENING ORDER (NO ISOLATION) - Swab, Nasopharynx [[id number]]  (Normal) Collected: 12/02/22 1547    Lab Status: Final result Specimen: Swab from Nasopharynx Updated: 12/02/22 1701    Narrative:      The following orders were created for panel order COVID PRE-OP / PRE-PROCEDURE SCREENING ORDER (NO ISOLATION) - Swab, Nasopharynx.  Procedure                               Abnormality         Status                     ---------                               -----------         ------                     COVID-19 and FLU A/B PCR...[[id number]]  Normal              Final result                 Please view results for these tests on the individual orders.    COVID-19 and FLU A/B PCR - Swab, Nasopharynx [[id number]]  (Normal) Collected: 12/02/22 1547    Lab Status: Final result Specimen: Swab from  Nasopharynx Updated: 12/02/22 1701     COVID19 Not Detected     Influenza A PCR Not Detected     Influenza B PCR Not Detected    Narrative:      Fact sheet for providers: https://www.fda.gov/media/064719/download    Fact sheet for patients: https://www.fda.gov/media/381777/download    Test performed by PCR.          Adult Transthoracic Echo Complete W/ Cont if Necessary Per Protocol    Result Date: 12/3/2022  •  Estimated left ventricular EF = 60% •  Estimated right ventricular systolic pressure from tricuspid regurgitation is 51 mmHg.     CT Abdomen Pelvis Without Contrast    Result Date: 12/3/2022  CT ABDOMEN PELVIS WO CONTRAST Date of Exam: 12/2/2022 9:23 PM EST Indication: low abdominal pain, lymph nodes palpable.  Negative bilateral lower extremity venous Doppler ultrasound 12/2/2022 Comparison: CT abdomen pelvis 10/31/2011 Technique: Axial CT images were obtained of the abdomen and pelvis without the administration of contrast. Reconstructed coronal and sagittal images were also obtained. Automated exposure control and iterative construction methods were used. Findings: Lower Thorax: Linear opacity in the lung bases right greater than left likely due to subsegmental atelectasis. Peritoneum: No free air or free fluid. Appendix: Appendix is well seen and is normal. Kidneys, ureters, and urinary bladder: No hydronephrosis. In the left kidney there is a 2 mm calculus mid aspect without obstruction.. No focal renal lesions.  Normal appearance of urinary bladder given the amount of distention. Liver, gallbladder, and bile ducts: Subtle 2 cm area of low-density near the fossa ligament and the left medial hepatic lobe likely due to focal fatty infiltration. Gallbladder is contracted. No significant inflammation.. No bile duct dilatation. Spleen: Spleen is normal size.  No focal splenic lesions. Adrenal glands: Unremarkable. Pancreas: No focal masses.  No pancreatic duct dilation. No surrounding inflammation. Abdominal  aorta and Vascular Structures: No aneurysmal dilation. No significant atherosclerotic disease. Stomach and Bowel: No abnormally dilated loops of bowel.  No significant hiatal hernia.  No significant bowel wall thickening.  Ligament of Treitz has normal anatomic position. Mild stool burden. Reproductive Organs: Within normal limits. Lymph nodes: There is a upper limits normal size retroperitoneal lymph node on the left measuring 9 mm in short axis image #40. This is similar to the previous CT. There are prominent inguinal lymph nodes present bilaterally. The largest on the right measures 1.3 x 1.9 cm. The largest on the left measures 1.1 x 1.8 cm. There is a lymph node along the right iliac chain measuring about 9 mm in short axis on image #101. Soft tissues: There is a fat and fluid paraumbilical hernia left of midline measuring 2.3 x 1.7 cm. Osseous structures: No aggressive focal lytic or sclerotic osseous lesions. Evaluation of bowel and solid organs is limited without contrast administration.     Impression: 1.There are some mildly enlarged inguinal lymph nodes. These are nonspecific. They may be reactive. Neoplastic lymphadenopathy is less likely given the appearance. Consider tissue diagnosis or follow-up clinically and/or with imaging. 2.Nonobstructing left renal calculus. 3.Probable focal fatty infiltration of the liver. This could be evaluated with dedicated liver MRI. 4.Small fat and fluid containing paraumbilical hernia measuring 2.3 cm maximally. Electronically Signed: Juliana Galeas MD  12/3/2022 12:17 PM EST  Workstation ID: [id number]    XR Chest 1 View    Result Date: 12/2/2022   DATE OF EXAM: 12/2/2022 10:23 AM  PROCEDURE: XR CHEST 1 VW-  INDICATIONS: bilateral leg swelling  COMPARISON: 5/20/2021  TECHNIQUE: Single view  FINDINGS:  Study is limited by overlying support and monitoring apparatus.  The heart size is within normal limits. Pulmonary vascularity is within normal limits. Linear opacities noted  at the lung bases compatible with scarring or atelectasis.. Osseous structures are unremarkable      Impression: IMPRESSION : Linear opacities at the lung bases compatible scarring or atelectasis.  Otherwise unremarkable exam[  This report was finalized on 12/2/2022 10:51 AM by Lionel Joshua.      Duplex Venous Lower Extremity - Bilateral    Result Date: 12/2/2022  •  Normal bilateral lower extremity venous duplex scan.       Results for orders placed during the hospital encounter of 12/02/22    Adult Transthoracic Echo Complete W/ Cont if Necessary Per Protocol    Interpretation Summary  •  Estimated left ventricular EF = 60%  •  Estimated right ventricular systolic pressure from tricuspid regurgitation is 51 mmHg.      I have reviewed the medications:  Scheduled Meds:cefTRIAXone, 1 g, Intravenous, Q24H  DAPTOmycin, 400 mg, Intravenous, Q24H  enoxaparin, 40 mg, Subcutaneous, Daily  lactobacillus acidophilus, 1 capsule, Oral, Daily  metoprolol tartrate, 25 mg, Oral, Q12H  senna-docusate sodium, 2 tablet, Oral, BID  sodium chloride, 10 mL, Intravenous, Q12H      Continuous Infusions:   PRN Meds:.•  acetaminophen  •  aluminum-magnesium hydroxide-simethicone  •  senna-docusate sodium **AND** polyethylene glycol **AND** bisacodyl **AND** bisacodyl  •  labetalol  •  ondansetron  •  sodium chloride  •  sodium chloride  •  sodium chloride  •  temazepam    Assessment & Plan   Assessment & Plan     Active Hospital Problems    Diagnosis  POA   • **History of endocarditis [Z86.79]  Not Applicable   • Cellulitis of extremity [L03.119]  Unknown   • Drug use [F19.90]  Unknown   • History of DVT (deep vein thrombosis) [Z86.718]  Not Applicable   • Dysuria [R30.0]  Unknown   • Migraine [G43.909]  Yes   • Hypertension [I10]  Yes      Resolved Hospital Problems   No resolved problems to display.        Brief Hospital Course to date:  Marcia Goel is a 37 y.o. female with past medical history significant for IV drug abuse,  history of bacteremia and endocarditis, hypertension.  Evidently patient has had swelling of lower extremities bilaterally although more on the right compared to the left since several days ago.  During the past week she has noticed patchy redness that is bilateral on lower extremities and getting worse. Also complains of abdominal/pelvic pain and palpable lymph nodes in the groin. Patient also has noticed an abscess with some drainage on the lateral aspect of right thigh since couple of days ago.    Assessment and plan:  Right lower extremity abscess with cellulitis   Right lower extremity abscess was drained in ER  Wound culture growing alphahemolytic strep  Was initially on IV daptomycin and rocephin.  Currently on Rocephin alone  ID team consulted.  Appreciate the help     Right heart failure   Bilateral lower extremity swelling  Patient with severe pulmonary hypertension and echocardiogram  Per her partner, she does snore at night and has periods of apnea which raise suspicion for obstructive sleep apnea which likely precipitated her right heart failure and pulmonary hypertension  She does admit to gaining more than 20 pounds of weight recently  Start p.o. torsemide  Patient instructed to have sleep study done after discharge from the hospital    Dysuria  Pelvic Pain  CT A/P showing reactive lymph nodes in both groins, worse on the right side  Follow urine cx and chlamydia, trich, NG pending     Essential hypertension  Not compliant with home meds  Continue metoprolol     History of DVTs  Not compliant with AC  Bilateral lower extremity Doppler ultrasound negative for DVT  Continue prophylactic dose Lovenox for now     History of IVDA  History of Endocarditis/ bacteremia  Clean from IV drug use for the last 6 months per her statement  She admits to using cocaine, correlates with UDS       DVT prophylaxis:  lovenox    Expected Discharge Location and Transportation: home  Expected Discharge Date: 12/6/2022    DVT  prophylaxis:  Medical DVT prophylaxis orders are present.          CODE STATUS:   Code Status and Medical Interventions:   Ordered at: 12/02/22 1605     Level Of Support Discussed With:    Patient     Code Status (Patient has no pulse and is not breathing):    CPR (Attempt to Resuscitate)     Medical Interventions (Patient has pulse or is breathing):    Full Support       Jeffy Mackey MD  12/04/22

## 2022-12-05 ENCOUNTER — READMISSION MANAGEMENT (OUTPATIENT)
Dept: CALL CENTER | Facility: HOSPITAL | Age: 37
End: 2022-12-05

## 2022-12-05 VITALS
HEART RATE: 79 BPM | HEIGHT: 60 IN | TEMPERATURE: 98.1 F | SYSTOLIC BLOOD PRESSURE: 161 MMHG | DIASTOLIC BLOOD PRESSURE: 97 MMHG | BODY MASS INDEX: 26.1 KG/M2 | RESPIRATION RATE: 16 BRPM | OXYGEN SATURATION: 93 % | WEIGHT: 132.94 LBS

## 2022-12-05 PROCEDURE — 99239 HOSP IP/OBS DSCHRG MGMT >30: CPT | Performed by: INTERNAL MEDICINE

## 2022-12-05 PROCEDURE — 25010000002 ENOXAPARIN PER 10 MG: Performed by: NURSE PRACTITIONER

## 2022-12-05 RX ORDER — TORSEMIDE 20 MG/1
20 TABLET ORAL DAILY
Qty: 30 TABLET | Refills: 0 | Status: SHIPPED | OUTPATIENT
Start: 2022-12-05 | End: 2023-01-04

## 2022-12-05 RX ORDER — CLINDAMYCIN HYDROCHLORIDE 300 MG/1
600 CAPSULE ORAL EVERY 8 HOURS SCHEDULED
Qty: 30 CAPSULE | Refills: 0 | Status: SHIPPED | OUTPATIENT
Start: 2022-12-05 | End: 2022-12-10

## 2022-12-05 RX ORDER — L.ACID,PARA/B.BIFIDUM/S.THERM 8B CELL
1 CAPSULE ORAL DAILY
Qty: 10 CAPSULE | Refills: 0 | Status: SHIPPED | OUTPATIENT
Start: 2022-12-05 | End: 2022-12-15

## 2022-12-05 RX ORDER — MUPIROCIN CALCIUM 20 MG/G
1 CREAM TOPICAL 2 TIMES DAILY
Qty: 30 G | Refills: 1 | Status: SHIPPED | OUTPATIENT
Start: 2022-12-05 | End: 2022-12-15

## 2022-12-05 RX ORDER — CEPHALEXIN 500 MG/1
500 CAPSULE ORAL EVERY 8 HOURS SCHEDULED
Qty: 15 CAPSULE | Refills: 0 | Status: SHIPPED | OUTPATIENT
Start: 2022-12-05 | End: 2022-12-10

## 2022-12-05 RX ORDER — LOSARTAN POTASSIUM 50 MG/1
50 TABLET ORAL DAILY
Qty: 30 TABLET | Refills: 1 | Status: SHIPPED | OUTPATIENT
Start: 2022-12-05 | End: 2023-01-04

## 2022-12-05 RX ORDER — CHLORHEXIDINE GLUCONATE 4 G/100ML
SOLUTION TOPICAL DAILY
Qty: 473 ML | Refills: 2 | Status: SHIPPED | OUTPATIENT
Start: 2022-12-05 | End: 2022-12-19

## 2022-12-05 RX ADMIN — ENOXAPARIN SODIUM 40 MG: 40 INJECTION SUBCUTANEOUS at 08:40

## 2022-12-05 RX ADMIN — DOCUSATE SODIUM 50 MG AND SENNOSIDES 8.6 MG 2 TABLET: 8.6; 5 TABLET, FILM COATED ORAL at 08:40

## 2022-12-05 RX ADMIN — Medication 1 CAPSULE: at 08:40

## 2022-12-05 RX ADMIN — OXYCODONE HYDROCHLORIDE AND ACETAMINOPHEN 1 TABLET: 5; 325 TABLET ORAL at 06:24

## 2022-12-05 RX ADMIN — CLINDAMYCIN HYDROCHLORIDE 300 MG: 150 CAPSULE ORAL at 06:17

## 2022-12-05 RX ADMIN — Medication 10 ML: at 08:41

## 2022-12-05 RX ADMIN — CEPHALEXIN 500 MG: 250 CAPSULE ORAL at 08:40

## 2022-12-05 NOTE — OUTREACH NOTE
Prep Survey    Flowsheet Row Responses   [facility] patient discharged from? [facility]   Is LACE score < 7 ? Yes   Emergency Room discharge w/ pulse ox? No   Eligibility [facility]   Date of Admission 12/02/22   Date of Discharge 12/05/22   Discharge Disposition Home or Self Care   Discharge diagnosis right heart failure, RLE abscess with cellulitis    Does the patient have one of the following disease processes/diagnoses(primary or secondary)? CHF   Does the patient have Home health ordered? No   Is there a DME ordered? No   Prep survey completed? Yes          PARAMJIT BOND - Registered Nurse

## 2022-12-05 NOTE — PLAN OF CARE
Problem: Adult Inpatient Plan of Care  Goal: Plan of Care Review  Outcome: Ongoing, Progressing  Flowsheets (Taken 12/5/2022 0345)  Progress: improving  Plan of Care Reviewed With: patient  Goal: Patient-Specific Goal (Individualized)  Outcome: Ongoing, Progressing  Goal: Absence of Hospital-Acquired Illness or Injury  Outcome: Ongoing, Progressing  Intervention: Identify and Manage Fall Risk  Recent Flowsheet Documentation  Taken 12/4/2022 2035 by Анна Miranda RN  Safety Promotion/Fall Prevention:   fall prevention program maintained   safety round/check completed  Intervention: Prevent Skin Injury  Recent Flowsheet Documentation  Taken 12/4/2022 2035 by Анна Miranda RN  Body Position: position changed independently  Intervention: Prevent and Manage VTE (Venous Thromboembolism) Risk  Recent Flowsheet Documentation  Taken 12/4/2022 2035 by Анна Miranda RN  Activity Management: activity adjusted per tolerance  VTE Prevention/Management: (sub q lovenox) other (see comments)  Range of Motion: active ROM (range of motion) encouraged  Intervention: Prevent Infection  Recent Flowsheet Documentation  Taken 12/4/2022 2035 by Анна Miranda RN  Infection Prevention:   rest/sleep promoted   single patient room provided  Goal: Optimal Comfort and Wellbeing  Outcome: Ongoing, Progressing  Intervention: Provide Person-Centered Care  Recent Flowsheet Documentation  Taken 12/4/2022 2035 by Анна Miranda RN  Trust Relationship/Rapport: care explained  Goal: Readiness for Transition of Care  Outcome: Ongoing, Progressing     Problem: Hypertension Comorbidity  Goal: Blood Pressure in Desired Range  Outcome: Ongoing, Progressing  Intervention: Maintain Blood Pressure Management  Recent Flowsheet Documentation  Taken 12/4/2022 2035 by Анна Miranda RN  Medication Review/Management: medications reviewed   Goal Outcome Evaluation:  Plan of Care Reviewed With: patient        Progress: improving

## 2022-12-05 NOTE — DISCHARGE SUMMARY
[facility] Medicine Services  DISCHARGE SUMMARY    Patient Name: Marcia Goel  : 1985  MRN: [id number]    Date of Admission: 2022  9:29 AM  Date of Discharge:  2022  Primary Care Physician: Provider, No Known    Consults     Date and Time Order Name Status Description    2022  8:35 AM Inpatient Infectious Diseases Consult Completed           Hospital Course     Presenting Problem:   History of endocarditis [Z86.79]    Active Hospital Problems    Diagnosis  POA   • **History of endocarditis [Z86.79]  Not Applicable   • Cellulitis of extremity [L03.119]  Unknown   • Drug use [F19.90]  Unknown   • History of DVT (deep vein thrombosis) [Z86.718]  Not Applicable   • Dysuria [R30.0]  Unknown   • Migraine [G43.909]  Yes   • Hypertension [I10]  Yes      Resolved Hospital Problems   No resolved problems to display.          Hospital Course:  Marcia Goel is a 37 y.o. female with past medical history significant for IV drug abuse, history of bacteremia and endocarditis, hypertension.  Evidently patient has had swelling of lower extremities bilaterally although more on the right compared to the left since several days ago.  During the week prior to this hospitalization, she has noticed patchy redness that is bilateral on lower extremities and getting worse.  Eventually presented to the hospital and was found to have right lower thigh abscess that was incised and drained in ER.  Started empirically on IV daptomycin and Rocephin.  Seen by Dr. Benson/ID during this hospitalization and was discharged on Keflex and clindamycin.  She was found to have lower extremity swelling as well likely secondary to right heart failure and severe pulmonary hypertension noted on her echo.  Diuresed well with torsemide.  She was advised to have sleep study after discharge from the hospital for further evaluation for possible obstructive sleep apnea that might explain her right heart strain.  She  was also prescribed mupirocin and chlorhexidine solution for MRSA decolonization    Right lower extremity abscess with cellulitis   Right lower extremity abscess was drained in ER  Wound culture growing alphahemolytic strep  Was initially on IV daptomycin and rocephin.  Seen by Dr. Benson/ID during this hospitalization.  He recommended to discharge the patient on 5-day course of clindamycin and Keflex     Right heart failure   Bilateral lower extremity swelling  Patient with severe pulmonary hypertension and echocardiogram  Per her partner, she does snore at night and has periods of apnea which raise suspicion for obstructive sleep apnea which likely precipitated her right heart failure and pulmonary hypertension  She does admit to gaining more than 20 pounds of weight recently  Diuresed well with p.o. torsemide and was given prescription upon discharge.  She was instructed to use torsemide 20 mg daily for 3 days and as needed as needed there after  She was given referral to have sleep study done after discharge     Essential hypertension  Not compliant with home meds  Prescribed losartan upon discharge     History of DVT  Bilateral lower extremity Doppler ultrasound negative for DVT     History of IVDA  History of Endocarditis/ bacteremia  Clean from IV drug use for the last 6 months per her statement  She admits to using cocaine, correlates with UDS     MRSA Colonization  Given her recurrent skin infections and her positive MRSA screen from nares and axilla, was given prescription for Bactroban nasal cream and chlorhexidine body bath X 14 days prescribed on d/c for decolonization    Discharge Follow Up Recommendations for outpatient labs/diagnostics:  PCP in 1 week  Referral for sleep study given upon discharge    Day of Discharge     HPI:   I have seen and evaluated the patient this morning.  Feeling much better after she diuresed.  Swelling in her lower extremity much improved.  Wants to go home    Review of  Systems  General : no fevers, chills  CVS: No chest pain, palpitations  Respiratory: No cough, dyspnea  GI: No N/V/D, abd pain  10 point review of systems is negative except for what is mentioned in HPI    Vital Signs:   Temp:  [97.6 °F (36.4 °C)-98.1 °F (36.7 °C)] 98.1 °F (36.7 °C)  Heart Rate:  [64-84] 77  Resp:  [16-20] 18  BP: (105-157)/() 144/98      Physical Exam:  General: Comfortable, not in distress, conversant and cooperative  Head: Atraumatic and normocephalic  Eyes: No Icterus. No pallor  Ears:  Ears appear intact with no abnormalities noted  Throat: No oral lesions, no thrush  Neck: Supple, trachea midline  Lungs: Clear to auscultation bilaterally, equal air entry, no wheezing or crackles  Heart:  Normal S1 and S2, no murmur, no gallop, No JVD, no lower extremity swelling  Abdomen:  Soft, no tenderness, no organomegaly, normal bowel sounds, no organomegaly  Extremities: pulses equal bilaterally  Skin: Multiple skin scabs all over her body.  Mainly lower extremities.  There is a well-circumscribed small drained abscess along the lateral aspect of her right knee with slight surrounding erythema  Neurologic: Cranial nerves appear intact with no evidence of facial asymmetry, normal motor and sensory functions in all 4 extremities  Psych: Alert and oriented x 3, normal mood    Pertinent  and/or Most Recent Results     LAB RESULTS:      Lab 12/04/22  1845 12/04/22  0917 12/02/22  1034 12/02/22  0920   WBC  --  4.00  --  5.22   HEMOGLOBIN  --  11.3*  --  12.5   HEMATOCRIT  --  37.3  --  39.7   PLATELETS  --  194  --  283   NEUTROS ABS  --  2.37  --  3.42   IMMATURE GRANS (ABS)  --  0.01  --  0.01   LYMPHS ABS  --  1.10  --  1.15   MONOS ABS  --  0.34  --  0.45   EOS ABS  --  0.15  --  0.16   MCV  --  100.3*  --  97.1*   SED RATE  --  21*  --   --    CRP 0.67*  --   --  3.83*   PROCALCITONIN  --   --   --  0.61*   LACTATE  --   --  0.7  --          Lab 12/04/22  1845 12/02/22  0920   SODIUM 140 140    POTASSIUM 4.1 3.6   CHLORIDE 105 104   CO2 23.0 26.0   ANION GAP 12.0 10.0   BUN 10 10   CREATININE 0.77 0.74   EGFR 100.8 105.7   GLUCOSE 86 87   CALCIUM 9.0 9.1         Lab 12/02/22  0920   TOTAL PROTEIN 8.0   ALBUMIN 3.70   GLOBULIN 4.3   ALT (SGPT) 44*   AST (SGOT) 54*   BILIRUBIN 0.5   ALK PHOS 52         Lab 12/02/22  0920   PROBNP 387.0   TROPONIN T <0.010                 Brief Urine Lab Results  (Last result in the past 365 days)      Color   Clarity   Blood   Leuk Est   Nitrite   Protein   CREAT   Urine HCG        12/02/22 1109               Negative       12/02/22 1109 Yellow   Turbid   Negative   Negative   Negative   Negative               Microbiology Results (last 10 days)     Procedure Component Value - Date/Time    MRSA Screen, PCR (Inpatient) - Swab, Nares [[id number]]  (Abnormal) Collected: 12/04/22 1628    Lab Status: Final result Specimen: Swab from Nares Updated: 12/04/22 1747     MRSA PCR Positive    Narrative:      The negative predictive value of this diagnostic test is high and should only be used to consider de-escalating anti-MRSA therapy. A positive result may indicate colonization with MRSA and must be correlated clinically.    Wound Culture - Swab, Leg, Right [[id number]]  (Abnormal) Collected: 12/02/22 1744    Lab Status: Preliminary result Specimen: Swab from Leg, Right Updated: 12/04/22 1025     Wound Culture Moderate growth (3+) Streptococcus, Alpha Hemolytic     Gram Stain No WBCs or organisms seen    COVID PRE-OP / PRE-PROCEDURE SCREENING ORDER (NO ISOLATION) - Swab, Nasopharynx [[id number]]  (Normal) Collected: 12/02/22 1547    Lab Status: Final result Specimen: Swab from Nasopharynx Updated: 12/02/22 1701    Narrative:      The following orders were created for panel order COVID PRE-OP / PRE-PROCEDURE SCREENING ORDER (NO ISOLATION) - Swab, Nasopharynx.  Procedure                               Abnormality         Status                     ---------                                -----------         ------                     COVID-19 and FLU A/B PCR...[[id number]]  Normal              Final result                 Please view results for these tests on the individual orders.    COVID-19 and FLU A/B PCR - Swab, Nasopharynx [[id number]]  (Normal) Collected: 12/02/22 1547    Lab Status: Final result Specimen: Swab from Nasopharynx Updated: 12/02/22 1701     COVID19 Not Detected     Influenza A PCR Not Detected     Influenza B PCR Not Detected    Narrative:      Fact sheet for providers: https://www.fda.gov/media/797726/download    Fact sheet for patients: https://www.fda.gov/media/297679/download    Test performed by PCR.    Urine Culture - Urine, Urine, Clean Catch [[id number]]  (Abnormal)  (Susceptibility) Collected: 12/02/22 1109    Lab Status: Final result Specimen: Urine, Clean Catch Updated: 12/04/22 0945     Urine Culture >100,000 CFU/mL Escherichia coli    Narrative:      Colonization of the urinary tract without infection is common. Treatment is discouraged unless the patient is symptomatic, pregnant, or undergoing an invasive urologic procedure.    Susceptibility      Escherichia coli      ANUPAM      Amikacin Susceptible      Ampicillin Resistant     Ampicillin + Sulbactam Intermediate      Cefazolin Susceptible      Cefepime Susceptible      Ceftazidime Susceptible      Ceftriaxone Susceptible      Gentamicin Resistant     Levofloxacin Resistant     Nitrofurantoin Susceptible      Piperacillin + Tazobactam Susceptible      Tobramycin Intermediate      Trimethoprim + Sulfamethoxazole Resistant                                Adult Transthoracic Echo Complete W/ Cont if Necessary Per Protocol    Result Date: 12/3/2022  •  Estimated left ventricular EF = 60% •  Estimated right ventricular systolic pressure from tricuspid regurgitation is 51 mmHg.     CT Abdomen Pelvis Without Contrast    Result Date: 12/3/2022  CT ABDOMEN PELVIS WO CONTRAST Date of Exam: 12/2/2022 9:23 PM EST Indication: low  abdominal pain, lymph nodes palpable.  Negative bilateral lower extremity venous Doppler ultrasound 12/2/2022 Comparison: CT abdomen pelvis 10/31/2011 Technique: Axial CT images were obtained of the abdomen and pelvis without the administration of contrast. Reconstructed coronal and sagittal images were also obtained. Automated exposure control and iterative construction methods were used. Findings: Lower Thorax: Linear opacity in the lung bases right greater than left likely due to subsegmental atelectasis. Peritoneum: No free air or free fluid. Appendix: Appendix is well seen and is normal. Kidneys, ureters, and urinary bladder: No hydronephrosis. In the left kidney there is a 2 mm calculus mid aspect without obstruction.. No focal renal lesions.  Normal appearance of urinary bladder given the amount of distention. Liver, gallbladder, and bile ducts: Subtle 2 cm area of low-density near the fossa ligament and the left medial hepatic lobe likely due to focal fatty infiltration. Gallbladder is contracted. No significant inflammation.. No bile duct dilatation. Spleen: Spleen is normal size.  No focal splenic lesions. Adrenal glands: Unremarkable. Pancreas: No focal masses.  No pancreatic duct dilation. No surrounding inflammation. Abdominal aorta and Vascular Structures: No aneurysmal dilation. No significant atherosclerotic disease. Stomach and Bowel: No abnormally dilated loops of bowel.  No significant hiatal hernia.  No significant bowel wall thickening.  Ligament of Treitz has normal anatomic position. Mild stool burden. Reproductive Organs: Within normal limits. Lymph nodes: There is a upper limits normal size retroperitoneal lymph node on the left measuring 9 mm in short axis image #40. This is similar to the previous CT. There are prominent inguinal lymph nodes present bilaterally. The largest on the right measures 1.3 x 1.9 cm. The largest on the left measures 1.1 x 1.8 cm. There is a lymph node along the  right iliac chain measuring about 9 mm in short axis on image #101. Soft tissues: There is a fat and fluid paraumbilical hernia left of midline measuring 2.3 x 1.7 cm. Osseous structures: No aggressive focal lytic or sclerotic osseous lesions. Evaluation of bowel and solid organs is limited without contrast administration.     1.There are some mildly enlarged inguinal lymph nodes. These are nonspecific. They may be reactive. Neoplastic lymphadenopathy is less likely given the appearance. Consider tissue diagnosis or follow-up clinically and/or with imaging. 2.Nonobstructing left renal calculus. 3.Probable focal fatty infiltration of the liver. This could be evaluated with dedicated liver MRI. 4.Small fat and fluid containing paraumbilical hernia measuring 2.3 cm maximally. Electronically Signed: Juliana Galeas MD  12/3/2022 12:17 PM EST  Workstation ID: [id number]    XR Chest 1 View    Result Date: 12/2/2022   DATE OF EXAM: 12/2/2022 10:23 AM  PROCEDURE: XR CHEST 1 VW-  INDICATIONS: bilateral leg swelling  COMPARISON: 5/20/2021  TECHNIQUE: Single view  FINDINGS:  Study is limited by overlying support and monitoring apparatus.  The heart size is within normal limits. Pulmonary vascularity is within normal limits. Linear opacities noted at the lung bases compatible with scarring or atelectasis.. Osseous structures are unremarkable      IMPRESSION : Linear opacities at the lung bases compatible scarring or atelectasis.  Otherwise unremarkable exam[  This report was finalized on 12/2/2022 10:51 AM by Lionel Joshua.      Duplex Venous Lower Extremity - Bilateral    Result Date: 12/2/2022  •  Normal bilateral lower extremity venous duplex scan.     US DOPPLER VENOUS LEGS BILATERAL    Result Date: 11/29/2022  BILATERAL LOWER EXTREMITY VENOUS DUPLEX DOPPLER EXAMINATION HISTORY: Left and Right Lower extremity pain. PROCEDURE: Multiple transverse and longitudinal scans were performed of both femoropopliteal deep venous system,  with augmentation and compression maneuvers. FINDINGS:  Normal phasic flow was noted in the visualized deep venous system. No intraluminal increased echogenicity is noted to suggest thrombus. There is normal compression and augmentation of the venous structures. No abnormal venous collaterals are seen.    No evidence of left or right lower extremity deep venous thrombosis. Images reviewed, interpreted, and dictated by DANDY Abdalla MD      Results for orders placed during the hospital encounter of 12/02/22    Duplex Venous Lower Extremity - Bilateral    Interpretation Summary  •  Normal bilateral lower extremity venous duplex scan.      Results for orders placed during the hospital encounter of 12/02/22    Duplex Venous Lower Extremity - Bilateral    Interpretation Summary  •  Normal bilateral lower extremity venous duplex scan.      Results for orders placed during the hospital encounter of 12/02/22    Adult Transthoracic Echo Complete W/ Cont if Necessary Per Protocol    Interpretation Summary  •  Estimated left ventricular EF = 60%  •  Estimated right ventricular systolic pressure from tricuspid regurgitation is 51 mmHg.      Plan for Follow-up of Pending Labs/Results:   Pending Labs     Order Current Status    Chlamydia trachomatis, Neisseria gonorrhoeae, Trichomonas vaginalis, PCR - Urine, Urine, Clean Catch In process    Wound Culture - Swab, Leg, Right Preliminary result        Discharge Details        Discharge Medications      New Medications      Instructions Start Date   cephalexin 500 MG capsule  Commonly known as: KEFLEX   500 mg, Oral, Every 8 Hours Scheduled      chlorhexidine 4 % external liquid  Commonly known as: HIBICLENS   Topical, Daily, Apply to whole body/ body bath daily for 2 weeks      clindamycin 300 MG capsule  Commonly known as: CLEOCIN   600 mg, Oral, Every 8 Hours Scheduled      lactobacillus acidophilus capsule capsule   1 capsule, Oral, Daily      losartan 50 MG  tablet  Commonly known as: Cozaar   50 mg, Oral, Daily      mupirocin 2 % cream  Commonly known as: Bactroban   1 application, Topical, 2 Times Daily, Apply to nasal cavity      torsemide 20 MG tablet  Commonly known as: Demadex   20 mg, Oral, Daily, Take it daily for 3 days Then as needed for increased swelling             Allergies   Allergen Reactions   • Benadryl [Diphenhydramine] Shortness Of Breath     Heart racing, muscle cramping   • Vancomycin Shortness Of Breath     Red man syndrome   • Compazine [Prochlorperazine] Other (See Comments)     Restless and agitation   • Reglan [Metoclopramide] Other (See Comments)     agitation   • Toradol [Ketorolac Tromethamine] Other (See Comments)     Heart racing and increased BP   • Triptans Other (See Comments)     restless         Discharge Disposition:  Home or Self Care    Diet:  Hospital:  Diet Order   Procedures   • Diet: Regular/House Diet; Texture: Regular Texture (IDDSI 7); Fluid Consistency: Thin (IDDSI 0)       Activity:  Activity Instructions     Activity as Tolerated            Restrictions or Other Recommendations:  None        CODE STATUS:    Code Status and Medical Interventions:   Ordered at: 12/02/22 1605     Level Of Support Discussed With:    Patient     Code Status (Patient has no pulse and is not breathing):    CPR (Attempt to Resuscitate)     Medical Interventions (Patient has pulse or is breathing):    Full Support       No future appointments.      Jeffy Mackey MD  12/05/22      Time Spent on Discharge:  I spent  42  minutes on this discharge activity which included: face-to-face encounter with the patient, reviewing the data in the system, coordination of the care with the nursing staff as well as consultants, documentation, and entering orders.

## 2022-12-05 NOTE — ACP (ADVANCE CARE PLANNING)
Case Management Discharge Note      Final Note: Patient has orders for discharge.  Patient had requested a PCP with [facility] [place] [facility].   has attempted multiple times to call the [facility] [phone] however it is only ringing busy, no known issues with this number.  Provided the number in the AVS.  Spoke with patient at bedside regarding discharge plan and explained the difficulty with getting through to make arrangements for a PCP, patient verbalizes understanding and will call to make arrangements.  Patient is packed and brushing her teeth.  No other discharge needs verbalized.  Patient plan is to discharge home today via car with family to transport.         Selected Continued Care - Admitted Since 12/2/2022     Destination    No services have been selected for the patient.              Durable Medical Equipment    No services have been selected for the patient.              Dialysis/Infusion    No services have been selected for the patient.              Home Medical Care    No services have been selected for the patient.              Therapy    No services have been selected for the patient.              Community Resources    No services have been selected for the patient.              Community & DME    No services have been selected for the patient.                       Final Discharge Disposition Code: 01 - home or self-care

## 2022-12-05 NOTE — PROGRESS NOTES
"[facility] Progress Note    Date of Admission: 2022      Antibiotics: keflex and clinda      CC:   Chief Complaint   Patient presents with   • Leg Swelling   • Wound Infection       S: Patient stable no n/v/d. Hemodynamically stable. Wound stable and plans for d/c today on oral antibiotics no fevers    O:  /97 (BP Location: Right arm, Patient Position: Sitting)   Pulse 79   Temp 98.1 °F (36.7 °C) (Oral)   Resp 16   Ht 152.4 cm (60\")   Wt 60.3 kg (132 lb 15 oz)   SpO2 93%   BMI 25.96 kg/m²   Temp (24hrs), Av °F (36.7 °C), Min:97.6 °F (36.4 °C), Max:98.1 °F (36.7 °C)      PE:   GEN: Awake and alert, in no acute distress.   HEENT: NCAT.  EOMI. No conjunctival injection. No icterus. Oropharynx clear without evidence of thrush or exudate.   NECK: Supple without nuchal rigidity  HEART: RRR; No murmur, rubs, gallops.   LUNGS: Clear to auscultation bilaterally without wheezing, rales, rhonchi. Normal respiratory effort.  ABDOMEN: Soft, nontender, nondistended. Positive bowel sounds. No rebound or guarding.   EXT:  No cyanosis, clubbing or edema  : Normal appearing genitalia without Lambert catheter.  MSK: FROM without joint effusions noted    SKIN: Multiple skin excoriations and track marks  NEURO: Oriented to PPT. No focal deficits.     Laboratory Data    Results from last 7 days   Lab Units 22  0917 22  0920   WBC 10*3/mm3 4.00 5.22   HEMOGLOBIN g/dL 11.3* 12.5   HEMATOCRIT % 37.3 39.7   PLATELETS 10*3/mm3 194 283     Results from last 7 days   Lab Units 22  1845   SODIUM mmol/L 140   POTASSIUM mmol/L 4.1   CHLORIDE mmol/L 105   CO2 mmol/L 23.0   BUN mg/dL 10   CREATININE mg/dL 0.77   GLUCOSE mg/dL 86   CALCIUM mg/dL 9.0     Results from last 7 days   Lab Units 22  0920   ALK PHOS U/L 52   BILIRUBIN mg/dL 0.5   ALT (SGPT) U/L 44*   AST (SGOT) U/L 54*     Results from last 7 days   Lab Units 22  0917   SED RATE mm/hr 21*     Results from last 7 days   Lab Units 22  1845 "   CRP mg/dL 0.67*       Estimated Creatinine Clearance: 79.6 mL/min (by C-G formula based on SCr of 0.77 mg/dL).      Microbiology:  Wound culture with alphahemolytic strep  Urine culture with E. coli    Radiology:  Imaging Results (Last 24 Hours)     ** No results found for the last 24 hours. **        PROBLEM LIST:   - right posterior knee abscess, with Alpha strep on culture  - IVDU, she states she has not injected in 6 months  - Ecoli UTI  - Bilateral lower extremity swelling- negative for DVT  - History of endocarditis/septic pulmonary emboli, 2018  - Elevated LFTs     PLAN/RECOMMENDATIONS:     -Local wound care plans for discharge today  -okay with Keflex 500 mg 3 times daily and clindamycin 300 mg 3 times daily x7 days upon discharge     No follow-up with ID necessary    Paul Benson MD  12/5/2022

## 2022-12-06 ENCOUNTER — TRANSITIONAL CARE MANAGEMENT TELEPHONE ENCOUNTER (OUTPATIENT)
Dept: CALL CENTER | Facility: HOSPITAL | Age: 37
End: 2022-12-06

## 2022-12-06 LAB
C TRACH RRNA SPEC QL NAA+PROBE: NEGATIVE
N GONORRHOEA RRNA SPEC QL NAA+PROBE: NEGATIVE
T VAGINALIS RRNA SPEC QL NAA+PROBE: NEGATIVE

## 2022-12-06 NOTE — OUTREACH NOTE
"Call Center TCM Note    Flowsheet Row Responses   [facility] patient discharged from? [place]   Does the patient have one of the following disease processes/diagnoses(primary or secondary)? CHF   TCM attempt successful? No  [No verbal release]   Unsuccessful attempts Attempt 1   Call Status Voice mail issues  [\"mailbox has not been set up yet\"]           Rama Marcelo RN    12/6/2022, 10:15 EST      "

## 2022-12-06 NOTE — OUTREACH NOTE
Call Center TCM Note    Flowsheet Row Responses   [facility] patient discharged from? [place]   Does the patient have one of the following disease processes/diagnoses(primary or secondary)? CHF   TCM attempt successful? No   Unsuccessful attempts Attempt 2           Rama Marcelo RN    12/6/2022, 11:31 EST

## 2022-12-07 ENCOUNTER — TRANSITIONAL CARE MANAGEMENT TELEPHONE ENCOUNTER (OUTPATIENT)
Dept: CALL CENTER | Facility: HOSPITAL | Age: 37
End: 2022-12-07

## 2022-12-07 LAB
BACTERIA SPEC AEROBE CULT: ABNORMAL
GRAM STN SPEC: ABNORMAL

## 2022-12-07 NOTE — OUTREACH NOTE
Call Center TCM Note    Flowsheet Row Responses   [facility] patient discharged from? [place]   Does the patient have one of the following disease processes/diagnoses(primary or secondary)? CHF   TCM attempt successful? No   Unsuccessful attempts Attempt 3           Pastora Gómez RN    12/7/2022, 09:25 EST